# Patient Record
Sex: FEMALE | Race: WHITE | NOT HISPANIC OR LATINO | URBAN - METROPOLITAN AREA
[De-identification: names, ages, dates, MRNs, and addresses within clinical notes are randomized per-mention and may not be internally consistent; named-entity substitution may affect disease eponyms.]

---

## 2022-02-15 ENCOUNTER — OFFICE VISIT (OUTPATIENT)
Dept: OBSTETRICS AND GYNECOLOGY | Facility: CLINIC | Age: 72
End: 2022-02-15

## 2022-02-15 VITALS
WEIGHT: 282 LBS | SYSTOLIC BLOOD PRESSURE: 132 MMHG | BODY MASS INDEX: 44.26 KG/M2 | HEIGHT: 67 IN | DIASTOLIC BLOOD PRESSURE: 74 MMHG

## 2022-02-15 DIAGNOSIS — E66.01 MORBID OBESITY WITH BMI OF 40.0-44.9, ADULT: ICD-10-CM

## 2022-02-15 DIAGNOSIS — Z01.411 ENCOUNTER FOR GYNECOLOGICAL EXAMINATION WITH ABNORMAL FINDING: Primary | ICD-10-CM

## 2022-02-15 DIAGNOSIS — N39.46 MIXED STRESS AND URGE URINARY INCONTINENCE: ICD-10-CM

## 2022-02-15 PROCEDURE — G0101 CA SCREEN;PELVIC/BREAST EXAM: HCPCS | Performed by: NURSE PRACTITIONER

## 2022-02-15 RX ORDER — ESCITALOPRAM OXALATE 20 MG/1
TABLET ORAL
COMMUNITY

## 2022-02-15 RX ORDER — LEVOTHYROXINE SODIUM 0.12 MG/1
1 TABLET ORAL DAILY
COMMUNITY
Start: 2021-11-30 | End: 2023-02-16

## 2022-02-15 RX ORDER — FLUOCINONIDE TOPICAL SOLUTION USP, 0.05% 0.5 MG/ML
SOLUTION TOPICAL
COMMUNITY

## 2022-02-15 RX ORDER — DEXLANSOPRAZOLE 60 MG/1
CAPSULE, DELAYED RELEASE ORAL
COMMUNITY

## 2022-02-15 RX ORDER — NYSTATIN 100000 [USP'U]/G
POWDER TOPICAL
COMMUNITY
End: 2023-02-16

## 2022-02-15 RX ORDER — MONTELUKAST SODIUM 10 MG/1
TABLET ORAL
COMMUNITY
End: 2023-02-16

## 2022-02-15 RX ORDER — SIMVASTATIN 20 MG
TABLET ORAL
COMMUNITY

## 2022-02-15 NOTE — PROGRESS NOTES
Chief Complaint  Ricarda Alvarez is a 71 y.o.  female presenting for Gynecologic Exam ( patient.  annual exam.  )    History of Present Illness  Very pleasant 72yo woman, my previous pt at [facility], here for annual gyn exam.  Her only gyn c/o is mixed urinary incontinence.  It is just mildly bothersome, and she has to wear pads daily.  Declines any referral to urology.  Couns re: avoiding bladder irritants, Kegel's exercises, modest wt loss to help symptoms.  Up to date on pap and mammo for sure; she thinks that she is also up to date on DEXA and colon ca screening, but unsure of the dates.  Will sign JAZMYN & get records from .  Not SA any longer (male factors).  Denies any vaginitis or UTIs.  Otherwise, all ROS neg.    The following portions of the patient's history were reviewed and updated as appropriate: allergies, current medications, past family history, past medical history, past social history, past surgical history and problem list.    Allergies   Allergen Reactions   • Penicillins Hives   • Bupropion Other (See Comments)         Current Outpatient Medications:   •  Probiotic Product (PROBIOTIC-10 PO), Take 1 tablet by mouth Daily., Disp: , Rfl:   •  ASPIRIN 81 PO, aspirin 81 mg tablet  Daily, Disp: , Rfl:   •  dexlansoprazole (Dexilant) 60 MG capsule, Dexilant 60 mg capsule, delayed release  TAKE ONE CAPSULE BY MOUTH DAILY, Disp: , Rfl:   •  escitalopram (LEXAPRO) 20 MG tablet, escitalopram 20 mg tablet  TAKE ONE TABLET BY MOUTH DAILY, Disp: , Rfl:   •  fluocinonide (LIDEX) 0.05 % external solution, fluocinonide 0.05 % topical solution  APPLY TO THE AFFECTED AREA(S) ON SCALP BY TOPICAL ROUTE 2 TIMES PER DAY X 2 WEEKS THEN PRN, Disp: , Rfl:   •  levothyroxine (SYNTHROID, LEVOTHROID) 125 MCG tablet, Take 1 tablet by mouth Daily., Disp: , Rfl:   •  montelukast (SINGULAIR) 10 MG tablet, montelukast 10 mg tablet  TAKE ONE TABLET BY MOUTH EVERY NIGHT AT BEDTIME, Disp: , Rfl:   •  nystatin (MYCOSTATIN)  "682521 UNIT/GM powder, nystatin 100,000 unit/gram topical powder  APPLY TO THE AFFECTED AREA(S) BY TOPICAL ROUTE 2 TIMES PER DAY, Disp: , Rfl:   •  simvastatin (ZOCOR) 20 MG tablet, simvastatin 20 mg tablet  TAKE ONE TABLET BY MOUTH EVERY NIGHT AT BEDTIME, Disp: , Rfl:     Past Medical History:   Diagnosis Date   • Abnormal Pap smear of cervix    • Anxiety    • Depression    • Family history of breast cancer in sister    • GERD (gastroesophageal reflux disease)    • HPV (human papilloma virus) infection    • Hyperlipidemia    • Hypothyroidism    • Menopause         Past Surgical History:   Procedure Laterality Date   • APPENDECTOMY     •  SECTION         Objective  /74   Ht 170.2 cm (67\")   Wt 128 kg (282 lb)   LMP  (LMP Unknown)   Breastfeeding No   BMI 44.17 kg/m²     Physical Exam  Exam conducted with a chaperone present.   Constitutional:       Appearance: Normal appearance. She is obese.   HENT:      Head: Normocephalic.   Neck:      Thyroid: No thyroid mass or thyromegaly.   Cardiovascular:      Rate and Rhythm: Normal rate and regular rhythm.      Heart sounds: Normal heart sounds.   Pulmonary:      Effort: Pulmonary effort is normal.      Breath sounds: Normal breath sounds.   Chest:   Breasts:      Right: No inverted nipple, mass, nipple discharge, axillary adenopathy or supraclavicular adenopathy.      Left: No inverted nipple, mass, nipple discharge, axillary adenopathy or supraclavicular adenopathy.       Abdominal:      Palpations: Abdomen is soft. There is no mass.      Tenderness: There is no abdominal tenderness.   Genitourinary:     General: Normal vulva.      Labia:         Right: No lesion.         Left: No lesion.       Vagina: Normal. No erythema.      Cervix: No discharge, lesion or erythema.      Uterus: Not enlarged and not tender.       Adnexa:         Right: No mass or tenderness.          Left: No mass or tenderness.        Comments: Vagina is still pink & appears fairly " well estrogenized.  Anus appears wnl.  No rectal exam performed.  Lymphadenopathy:      Upper Body:      Right upper body: No supraclavicular or axillary adenopathy.      Left upper body: No supraclavicular or axillary adenopathy.   Neurological:      Mental Status: She is alert.         Assessment/Plan   Diagnoses and all orders for this visit:    1. Encounter for gynecological examination with abnormal finding (Primary)    2. Mixed stress and urge urinary incontinence    3. Morbid obesity with BMI of 40.0-44.9, adult (HCC)      See couns above in the HPI.  Couns re: SBE, mammo & other pvt hlth procedures, including vaccines.    Procedures        Return in about 1 year (around 2/15/2023) for Annual physical, Please sign JAZMYN & get records from ..    Cheryl Patel, APRN  02/15/2022

## 2023-02-16 ENCOUNTER — OFFICE VISIT (OUTPATIENT)
Dept: OBSTETRICS AND GYNECOLOGY | Facility: CLINIC | Age: 73
End: 2023-02-16
Payer: MEDICARE

## 2023-02-16 VITALS
WEIGHT: 243.4 LBS | BODY MASS INDEX: 38.2 KG/M2 | HEIGHT: 67 IN | SYSTOLIC BLOOD PRESSURE: 130 MMHG | DIASTOLIC BLOOD PRESSURE: 86 MMHG

## 2023-02-16 DIAGNOSIS — B35.4 TINEA CORPORIS: ICD-10-CM

## 2023-02-16 DIAGNOSIS — Z13.820 SCREENING FOR OSTEOPOROSIS: ICD-10-CM

## 2023-02-16 DIAGNOSIS — Z12.31 ENCOUNTER FOR SCREENING MAMMOGRAM FOR MALIGNANT NEOPLASM OF BREAST: ICD-10-CM

## 2023-02-16 DIAGNOSIS — Z01.411 ENCOUNTER FOR GYNECOLOGICAL EXAMINATION WITH ABNORMAL FINDING: Primary | ICD-10-CM

## 2023-02-16 DIAGNOSIS — Z78.0 POSTMENOPAUSAL: ICD-10-CM

## 2023-02-16 DIAGNOSIS — Z87.42 HISTORY OF ABNORMAL CERVICAL PAP SMEAR: ICD-10-CM

## 2023-02-16 DIAGNOSIS — Z86.19 HISTORY OF HPV INFECTION: ICD-10-CM

## 2023-02-16 PROCEDURE — G0101 CA SCREEN;PELVIC/BREAST EXAM: HCPCS | Performed by: NURSE PRACTITIONER

## 2023-02-16 RX ORDER — NYSTATIN 100000 U/G
1 OINTMENT TOPICAL 2 TIMES DAILY
Qty: 30 G | Refills: 3 | Status: SHIPPED | OUTPATIENT
Start: 2023-02-16

## 2023-02-16 RX ORDER — LEVOTHYROXINE SODIUM 112 MCG
1 TABLET ORAL DAILY
COMMUNITY
Start: 2023-02-05

## 2023-02-16 NOTE — PROGRESS NOTES
Chief Complaint  Ricarda Alvarez is a 72 y.o.  female presenting for Gynecologic Exam (C/O fungal eruption on abdomen and under breasts.)    History of Present Illness  Ricarda is a very pleasant 73yo woman, , here for gyn exam.  She has PMHx of abn pap & HR HPV.  She does wish to repeat the pap smear today.  She had a hard year with COVID in July, and she battled a bad depression for > 6 months.  She is way better now.  She had elevated LFTs for a while, but that resolved after she got 40# wt off.  Praised for this!    We reviewed pvt health procedures.      The following portions of the patient's history were reviewed and updated as appropriate: allergies, current medications, past family history, past medical history, past social history, past surgical history and problem list.    Allergies   Allergen Reactions   • Penicillins Hives   • Bupropion Other (See Comments)         Current Outpatient Medications:   •  ASPIRIN 81 PO, aspirin 81 mg tablet  Daily, Disp: , Rfl:   •  dexlansoprazole (Dexilant) 60 MG capsule, Dexilant 60 mg capsule, delayed release  TAKE ONE CAPSULE BY MOUTH DAILY, Disp: , Rfl:   •  escitalopram (LEXAPRO) 20 MG tablet, escitalopram 20 mg tablet  TAKE ONE TABLET BY MOUTH DAILY, Disp: , Rfl:   •  fluocinonide (LIDEX) 0.05 % external solution, fluocinonide 0.05 % topical solution  APPLY TO THE AFFECTED AREA(S) ON SCALP BY TOPICAL ROUTE 2 TIMES PER DAY X 2 WEEKS THEN PRN, Disp: , Rfl:   •  nystatin (MYCOSTATIN) 914054 UNIT/GM ointment, Apply 1 application topically to the appropriate area as directed 2 (Two) Times a Day., Disp: 30 g, Rfl: 3  •  simvastatin (ZOCOR) 20 MG tablet, simvastatin 20 mg tablet  TAKE ONE TABLET BY MOUTH EVERY NIGHT AT BEDTIME, Disp: , Rfl:   •  Synthroid 112 MCG tablet, Take 1 tablet by mouth Daily., Disp: , Rfl:     Past Medical History:   Diagnosis Date   • Abnormal Pap smear of cervix    • Anxiety    • Depression    • Family history of breast cancer in sister    •  "GERD (gastroesophageal reflux disease)    • HPV (human papilloma virus) infection    • Hyperlipidemia    • Hypothyroidism    • Menopause         Past Surgical History:   Procedure Laterality Date   • APPENDECTOMY     •  SECTION         Objective  /86   Ht 170.2 cm (67\")   Wt 110 kg (243 lb 6.4 oz)   LMP  (LMP Unknown)   Breastfeeding No   BMI 38.12 kg/m²     Physical Exam  Vitals and nursing note reviewed. Exam conducted with a chaperone present.   Constitutional:       General: She is not in acute distress.     Appearance: Normal appearance. She is obese. She is not ill-appearing.   HENT:      Head: Normocephalic.   Neck:      Thyroid: No thyroid mass or thyromegaly.   Cardiovascular:      Rate and Rhythm: Normal rate and regular rhythm.      Heart sounds: Normal heart sounds. No murmur heard.  Pulmonary:      Effort: Pulmonary effort is normal. No respiratory distress.      Breath sounds: Normal breath sounds.   Chest:   Breasts:     Right: No inverted nipple, mass or nipple discharge.      Left: No inverted nipple, mass or nipple discharge.   Abdominal:      Palpations: Abdomen is soft. There is no mass.      Tenderness: There is no abdominal tenderness.   Genitourinary:     General: Normal vulva.      Labia:         Right: No lesion.         Left: No lesion.       Vagina: Normal. No erythema.      Cervix: No discharge, lesion or erythema.      Uterus: Not enlarged and not tender.       Adnexa:         Right: No mass or tenderness.          Left: No mass or tenderness.        Comments: Anus appears wnl.  No rectal exam performed.  Lymphadenopathy:      Upper Body:      Right upper body: No supraclavicular or axillary adenopathy.      Left upper body: No supraclavicular or axillary adenopathy.   Skin:     General: Skin is warm and dry.      Comments: Tinea corporis in groin & lower abd skin fold.   Neurological:      Mental Status: She is alert and oriented to person, place, and time. "   Psychiatric:         Mood and Affect: Mood normal.         Behavior: Behavior normal.         Assessment/Plan   Diagnoses and all orders for this visit:    1. Encounter for gynecological examination with abnormal finding (Primary)    2. History of abnormal cervical Pap smear    3. History of HPV infection    4. Tinea corporis  -     nystatin (MYCOSTATIN) 060918 UNIT/GM ointment; Apply 1 application topically to the appropriate area as directed 2 (Two) Times a Day.  Dispense: 30 g; Refill: 3    5. Postmenopausal  -     DEXA Bone Density Axial; Future    6. Screening for osteoporosis  -     DEXA Bone Density Axial; Future    7. Encounter for screening mammogram for malignant neoplasm of breast  -     Mammo Screening Digital Tomosynthesis Bilateral With CAD; Future        Procedures    40 to 64: Counseling/Anticipatory Guidance Discussed: nutrition, physical activity, screenings and self-breast exam    Return in about 1 year (around 2/16/2024) for Annual physical.    Cheryl Patel, APRN  02/16/2023

## 2023-02-20 LAB — REF LAB TEST METHOD: NORMAL

## 2023-06-19 ENCOUNTER — TRANSCRIBE ORDERS (OUTPATIENT)
Dept: ADMINISTRATIVE | Facility: HOSPITAL | Age: 73
End: 2023-06-19
Payer: MEDICARE

## 2023-06-19 DIAGNOSIS — K44.9 DIAPHRAGMATIC HERNIA WITHOUT OBSTRUCTION OR GANGRENE: Primary | ICD-10-CM

## 2023-12-14 ENCOUNTER — OFFICE VISIT (OUTPATIENT)
Dept: OBSTETRICS AND GYNECOLOGY | Facility: CLINIC | Age: 73
End: 2023-12-14
Payer: MEDICARE

## 2023-12-14 VITALS
RESPIRATION RATE: 14 BRPM | DIASTOLIC BLOOD PRESSURE: 76 MMHG | SYSTOLIC BLOOD PRESSURE: 112 MMHG | WEIGHT: 242.6 LBS | BODY MASS INDEX: 38 KG/M2

## 2023-12-14 DIAGNOSIS — N95.0 PMB (POSTMENOPAUSAL BLEEDING): Primary | ICD-10-CM

## 2023-12-14 DIAGNOSIS — N89.8 BLOODY VAGINAL DISCHARGE: ICD-10-CM

## 2023-12-14 RX ORDER — LEVOTHYROXINE SODIUM 137 UG/1
TABLET ORAL
COMMUNITY
Start: 2023-11-08

## 2023-12-14 RX ORDER — BUSPIRONE HYDROCHLORIDE 5 MG/1
5 TABLET ORAL 3 TIMES DAILY
COMMUNITY
Start: 2023-12-07

## 2023-12-15 ENCOUNTER — TELEPHONE (OUTPATIENT)
Dept: OBSTETRICS AND GYNECOLOGY | Facility: CLINIC | Age: 73
End: 2023-12-15

## 2023-12-15 ENCOUNTER — OFFICE VISIT (OUTPATIENT)
Dept: OBSTETRICS AND GYNECOLOGY | Facility: CLINIC | Age: 73
End: 2023-12-15
Payer: MEDICARE

## 2023-12-15 VITALS
BODY MASS INDEX: 37.73 KG/M2 | DIASTOLIC BLOOD PRESSURE: 82 MMHG | WEIGHT: 240.4 LBS | HEIGHT: 67 IN | SYSTOLIC BLOOD PRESSURE: 138 MMHG

## 2023-12-15 DIAGNOSIS — Z53.9 PROCEDURE NOT CARRIED OUT: ICD-10-CM

## 2023-12-15 DIAGNOSIS — N95.0 POSTMENOPAUSAL BLEEDING: Primary | ICD-10-CM

## 2023-12-15 NOTE — TELEPHONE ENCOUNTER
Marcelino Waite, do you mind to schedule this pt for a SIS and then call her on her cell?     Thank you!

## 2023-12-17 RX ORDER — CLOBETASOL PROPIONATE 0.5 MG/G
1 OINTMENT TOPICAL 2 TIMES DAILY
Qty: 30 G | Refills: 2 | Status: SHIPPED | OUTPATIENT
Start: 2023-12-17

## 2023-12-17 NOTE — PROGRESS NOTES
"Chief Complaint  Ricarda Alvarez is a 73 y.o.  female presenting for Procedure (Endometrial biopsy.) and Results (Pelvic ultrasound yesterday.)    History of Present Illness  Ricarda is here today for EMBx.  She is a 72yo woman, , with c/o recent PMB.  She had a pelvic US yesterday, which revealed an EMS of 14.6.  It was \"most suggestive of endometrial polypoid disease\".  And EMBx or SIS was suggested.      The following portions of the patient's history were reviewed and updated as appropriate: allergies, current medications, past family history, past medical history, past social history, past surgical history, and problem list.    Allergies   Allergen Reactions    Penicillins Hives    Bupropion Other (See Comments)         Current Outpatient Medications:     ASPIRIN 81 PO, aspirin 81 mg tablet  Daily, Disp: , Rfl:     busPIRone (BUSPAR) 5 MG tablet, Take 1 tablet by mouth 3 (Three) Times a Day., Disp: , Rfl:     dexlansoprazole (Dexilant) 60 MG capsule, Dexilant 60 mg capsule, delayed release  TAKE ONE CAPSULE BY MOUTH DAILY, Disp: , Rfl:     escitalopram (LEXAPRO) 20 MG tablet, escitalopram 20 mg tablet  TAKE ONE TABLET BY MOUTH DAILY, Disp: , Rfl:     fluocinonide (LIDEX) 0.05 % external solution, fluocinonide 0.05 % topical solution  APPLY TO THE AFFECTED AREA(S) ON SCALP BY TOPICAL ROUTE 2 TIMES PER DAY X 2 WEEKS THEN PRN (Patient not taking: Reported on 2023), Disp: , Rfl:     levothyroxine (SYNTHROID, LEVOTHROID) 137 MCG tablet, , Disp: , Rfl:     nystatin (MYCOSTATIN) 074596 UNIT/GM ointment, Apply 1 application topically to the appropriate area as directed 2 (Two) Times a Day., Disp: 30 g, Rfl: 3    simvastatin (ZOCOR) 20 MG tablet, simvastatin 20 mg tablet  TAKE ONE TABLET BY MOUTH EVERY NIGHT AT BEDTIME, Disp: , Rfl:     Past Medical History:   Diagnosis Date    Abnormal Pap smear of cervix     Anxiety     Depression     Family history of breast cancer in sister     GERD (gastroesophageal " "reflux disease)     HPV (human papilloma virus) infection     Hyperlipidemia     Hypothyroidism     Menopause         Past Surgical History:   Procedure Laterality Date    APPENDECTOMY       SECTION         Objective  /82   Ht 170.2 cm (67\")   Wt 109 kg (240 lb 6.4 oz)   LMP  (LMP Unknown)   Breastfeeding No   BMI 37.65 kg/m²     Physical Exam  Vitals and nursing note reviewed.   Constitutional:       General: She is not in acute distress.     Appearance: Normal appearance. She is not ill-appearing.   Skin:     General: Skin is warm and dry.      Findings: Rash present.      Comments: Pruritic, chronic rash in the upper cleft of buttocks.  Antifungal meds have failed to clear it.  She is willing to do short trial of a topical steroid.    Neurological:      Mental Status: She is alert and oriented to person, place, and time.   Psychiatric:         Mood and Affect: Mood normal.         Behavior: Behavior normal.         Assessment/Plan   Diagnoses and all orders for this visit:    1. Postmenopausal bleeding (Primary)  -     US Hysterosonogram Doppler; Future    2. Procedure not carried out  -     US Hysterosonogram Doppler; Future    Failed attempt at EMBx.  Refer for SIS.      Endometrial Biopsy    Date/Time: 2023 4:44 PM    Performed by: Cheryl Patel APRN  Authorized by: Cheryl Patel APRN    Consent:     Consent obtained: written    Consent given by: patient    Risks discussed: bleeding, infection, need for repeat procedure, conversion to surgery and pain    Alternatives discussed: observation and referral    Patient agrees, verbalizes understanding, and wants to proceed: yes      Procedure explained and questions answered to patient or proxy's satisfaction: yes    Indications:     Indications: postmenopausal bleeding and thickened endometrium      Chronicity of post-menopausal bleeding: new    Progression of post-menopausal bleeding: unchanged  Pre-procedure:     Urine " pregnancy test: N/A    Procedure:     A bimanual exam was performed: no      Prepped with: Betadine    Tenaculum used: yes      A local block was performed: no    Findings:     Cervix: stenotic    Comments:     Procedure comments: Cervical os was stenotic.  I made repeated attempts, but was unable to get through the internal os, in dilating the cervical canal.  The effort was aborted, and will refer for attempt at SIS.        40 to 64: Counseling/Anticipatory Guidance Discussed: EMBx risks & benefits.          Return for Saline Infused Scan (soon, if possible).    Cheryl Patel, APRN  12/15/2023

## 2023-12-28 NOTE — PROGRESS NOTES
Chief Complaint  Ricarda Alvarez is a 73 y.o.  female presenting for Follow-up (Has had abnormal discharge and odor for last couple months, the noticed red spotting just yesterday and a little today)    History of Present Illness  Ricarda is a 74yo woman, , here for gyn problem.  She c/o bothersome vaginal discharge x few months, and then began having some vaginal spotting yesterday & today.  She hasn't had any other episodes of bleeding since age 52yo.    Pap smear was taken 23, negative.      The following portions of the patient's history were reviewed and updated as appropriate: allergies, current medications, past family history, past medical history, past social history, past surgical history, and problem list.    Allergies   Allergen Reactions    Penicillins Hives    Bupropion Other (See Comments)         Current Outpatient Medications:     ASPIRIN 81 PO, aspirin 81 mg tablet  Daily, Disp: , Rfl:     busPIRone (BUSPAR) 5 MG tablet, Take 1 tablet by mouth 3 (Three) Times a Day., Disp: , Rfl:     dexlansoprazole (Dexilant) 60 MG capsule, Dexilant 60 mg capsule, delayed release  TAKE ONE CAPSULE BY MOUTH DAILY, Disp: , Rfl:     escitalopram (LEXAPRO) 20 MG tablet, escitalopram 20 mg tablet  TAKE ONE TABLET BY MOUTH DAILY, Disp: , Rfl:     levothyroxine (SYNTHROID, LEVOTHROID) 137 MCG tablet, , Disp: , Rfl:     nystatin (MYCOSTATIN) 957723 UNIT/GM ointment, Apply 1 application topically to the appropriate area as directed 2 (Two) Times a Day., Disp: 30 g, Rfl: 3    simvastatin (ZOCOR) 20 MG tablet, simvastatin 20 mg tablet  TAKE ONE TABLET BY MOUTH EVERY NIGHT AT BEDTIME, Disp: , Rfl:     clobetasol (TEMOVATE) 0.05 % ointment, Apply 1 application  topically to the appropriate area as directed 2 (Two) Times a Day., Disp: 30 g, Rfl: 2    Past Medical History:   Diagnosis Date    Abnormal Pap smear of cervix     Anxiety     Depression     Family history of breast cancer in sister     GERD  (gastroesophageal reflux disease)     HPV (human papilloma virus) infection     Hyperlipidemia     Hypothyroidism     Menopause         Past Surgical History:   Procedure Laterality Date    APPENDECTOMY       SECTION         Objective  /76 (BP Location: Right arm, Patient Position: Sitting, Cuff Size: Adult)   Resp 14   Wt 110 kg (242 lb 9.6 oz)   LMP  (LMP Unknown)   BMI 38.00 kg/m²     Physical Exam  Vitals and nursing note reviewed. Exam conducted with a chaperone present.   Constitutional:       Appearance: Normal appearance.   Abdominal:      General: Bowel sounds are normal.      Palpations: Abdomen is soft. There is no mass.      Tenderness: There is no abdominal tenderness.   Genitourinary:     General: Normal vulva.      Labia:         Right: No rash, tenderness or lesion.         Left: No rash, tenderness or lesion.       Vagina: Normal. No vaginal discharge or erythema.      Cervix: No cervical motion tenderness, discharge, lesion or erythema.      Uterus: Normal. Not enlarged and not tender.       Adnexa: Right adnexa normal and left adnexa normal.        Right: No mass or tenderness.          Left: No mass or tenderness.        Rectum: Normal.      Comments: The vaginal mucosa is still fairly pink & appears well estrogenized.  No cervical polyps or abn discharge.    Anus appears wnl.  (No rectal exam performed.)  Skin:     General: Skin is warm.   Neurological:      Mental Status: She is oriented to person, place, and time.   Psychiatric:         Mood and Affect: Mood normal.         Behavior: Behavior normal.         Assessment/Plan   Diagnoses and all orders for this visit:    1. PMB (postmenopausal bleeding) (Primary)  -     US Non-ob Transvaginal; Future    2. Bloody vaginal discharge  -     US Non-ob Transvaginal; Future    Will get US asap today & then try to do EMBx tomorrow.      Procedures    40 to 64: Counseling/Anticipatory Guidance Discussed: PMB and need for  work-up.    Return in about 1 day (around 12/15/2023) for US today;  EMBx tomorrow AM.    Cheryl Patel, APRN  12/14/2023

## 2024-01-02 NOTE — PROGRESS NOTES
Saline Infusion Sonogram     Date of procedure:  January 3, 2024     Risks and benefits were discussed.  All questions were answered.  Consents given by the patient verbally.     Pre-op indication:  Post-menopausal bleeding  Thickened endometrial lining  Corie Patel patient     Procedure documentation:    After the patient was identified and informed consent given she was placed in dorsal lithotomy position in stirrups and draped. A sterile speculum was placed inside the vagina with good visualization of the cervix and the cervix was cleaned with Betadine swabs.  The cervix was grasped with a single-tooth tenaculum.  A balloon catheter was introduced through the cervix without complication and inflated. The speculum was removed. The uterine cavity was then evaluated with transvaginal ultrasonography while saline was being instilled.    The findings were as follows:  The bilayer endometrial stripe measured 5 mm.  Focal lesions were present    The balloon was then released and the cavity was then drained of saline and the catheter was removed. Scant bleeding was noted from the cervical lip and the procedure was completed. The patient tolerated the procedure well and was given follow-up instructions.      Impression: Endometrial polypoid disease is present   Plan: Hysteroscopic evalulation of the cavity with probable Myosure  Today I discussed with Ricarda the risks of her upcoming hysteroscopy with possible Myosure resection of intracavitary pathology. Risks reviewed included intraoperative bleeding, infection at the site of surgery and damage to the adjacent surrounding organs. Additionally, the small risk for reoperation in the event of unanticipated bleeding or surgical injury was discussed.  Finally we discussed the risks of cerebral and/or pulmonary edema related to excess absorption of the distending fluid and the small chance the procedure could not be completed if there was an excessive mismatch of fluid infused &  fluid recovered.  All of her questions were answered fully.  She left with a very clear understanding of the preoperative surgical indications and the nature of the surgery for which she is scheduled.  She understands to be NPO after midnight and to be at the preoperative area ~ 1 hour prior to the scheduled surgical start time.     This note was electronically signed.    Sandy Nix MD   January 3, 2024

## 2024-01-03 ENCOUNTER — OFFICE VISIT (OUTPATIENT)
Dept: OBSTETRICS AND GYNECOLOGY | Facility: CLINIC | Age: 74
End: 2024-01-03
Payer: MEDICARE

## 2024-01-03 DIAGNOSIS — N95.0 PMB (POSTMENOPAUSAL BLEEDING): Primary | ICD-10-CM

## 2024-01-24 ENCOUNTER — HOSPITAL ENCOUNTER (EMERGENCY)
Facility: HOSPITAL | Age: 74
Discharge: HOME | End: 2024-01-24
Attending: EMERGENCY MEDICINE | Admitting: EMERGENCY MEDICINE
Payer: MEDICARE

## 2024-01-24 ENCOUNTER — APPOINTMENT (OUTPATIENT)
Dept: CT IMAGING | Facility: HOSPITAL | Age: 74
End: 2024-01-24
Payer: MEDICARE

## 2024-01-24 VITALS
TEMPERATURE: 97.8 F | SYSTOLIC BLOOD PRESSURE: 153 MMHG | OXYGEN SATURATION: 92 % | WEIGHT: 230 LBS | HEIGHT: 67 IN | RESPIRATION RATE: 22 BRPM | BODY MASS INDEX: 36.1 KG/M2 | HEART RATE: 87 BPM | DIASTOLIC BLOOD PRESSURE: 94 MMHG

## 2024-01-24 DIAGNOSIS — K59.00 CONSTIPATION, UNSPECIFIED CONSTIPATION TYPE: Primary | ICD-10-CM

## 2024-01-24 DIAGNOSIS — E87.6 HYPOKALEMIA: ICD-10-CM

## 2024-01-24 LAB
ALBUMIN SERPL-MCNC: 4.1 G/DL (ref 3.5–5.2)
ALBUMIN/GLOB SERPL: 1.4 G/DL
ALP SERPL-CCNC: 94 U/L (ref 39–117)
ALT SERPL W P-5'-P-CCNC: 12 U/L (ref 1–33)
ANION GAP SERPL CALCULATED.3IONS-SCNC: 16 MMOL/L (ref 5–15)
AST SERPL-CCNC: 15 U/L (ref 1–32)
BACTERIA UR QL AUTO: ABNORMAL /HPF
BASOPHILS # BLD AUTO: 0.04 10*3/MM3 (ref 0–0.2)
BASOPHILS NFR BLD AUTO: 0.6 % (ref 0–1.5)
BILIRUB SERPL-MCNC: 0.8 MG/DL (ref 0–1.2)
BILIRUB UR QL STRIP: ABNORMAL
BUN SERPL-MCNC: 10 MG/DL (ref 8–23)
BUN/CREAT SERPL: 11.9 (ref 7–25)
CALCIUM SPEC-SCNC: 9.2 MG/DL (ref 8.6–10.5)
CHLORIDE SERPL-SCNC: 102 MMOL/L (ref 98–107)
CLARITY UR: CLEAR
CO2 SERPL-SCNC: 22 MMOL/L (ref 22–29)
COLOR UR: YELLOW
CREAT BLDA-MCNC: 0.8 MG/DL (ref 0.6–1.3)
CREAT BLDA-MCNC: 0.8 MG/DL (ref 0.6–1.3)
CREAT SERPL-MCNC: 0.84 MG/DL (ref 0.57–1)
D-LACTATE SERPL-SCNC: 1.4 MMOL/L (ref 0.5–2)
DEPRECATED RDW RBC AUTO: 43.7 FL (ref 37–54)
EGFRCR SERPLBLD CKD-EPI 2021: 73.5 ML/MIN/1.73
EOSINOPHIL # BLD AUTO: 0.04 10*3/MM3 (ref 0–0.4)
EOSINOPHIL NFR BLD AUTO: 0.6 % (ref 0.3–6.2)
ERYTHROCYTE [DISTWIDTH] IN BLOOD BY AUTOMATED COUNT: 13 % (ref 12.3–15.4)
GLOBULIN UR ELPH-MCNC: 3 GM/DL
GLUCOSE SERPL-MCNC: 117 MG/DL (ref 65–99)
GLUCOSE UR STRIP-MCNC: NEGATIVE MG/DL
HCT VFR BLD AUTO: 46.4 % (ref 34–46.6)
HGB BLD-MCNC: 15.5 G/DL (ref 12–15.9)
HGB UR QL STRIP.AUTO: ABNORMAL
HOLD SPECIMEN: NORMAL
HYALINE CASTS UR QL AUTO: ABNORMAL /LPF
IMM GRANULOCYTES # BLD AUTO: 0.01 10*3/MM3 (ref 0–0.05)
IMM GRANULOCYTES NFR BLD AUTO: 0.1 % (ref 0–0.5)
KETONES UR QL STRIP: ABNORMAL
LEUKOCYTE ESTERASE UR QL STRIP.AUTO: ABNORMAL
LIPASE SERPL-CCNC: 52 U/L (ref 13–60)
LYMPHOCYTES # BLD AUTO: 1.49 10*3/MM3 (ref 0.7–3.1)
LYMPHOCYTES NFR BLD AUTO: 21.5 % (ref 19.6–45.3)
MCH RBC QN AUTO: 30.6 PG (ref 26.6–33)
MCHC RBC AUTO-ENTMCNC: 33.4 G/DL (ref 31.5–35.7)
MCV RBC AUTO: 91.5 FL (ref 79–97)
MONOCYTES # BLD AUTO: 0.29 10*3/MM3 (ref 0.1–0.9)
MONOCYTES NFR BLD AUTO: 4.2 % (ref 5–12)
NEUTROPHILS NFR BLD AUTO: 5.05 10*3/MM3 (ref 1.7–7)
NEUTROPHILS NFR BLD AUTO: 73 % (ref 42.7–76)
NITRITE UR QL STRIP: NEGATIVE
NRBC BLD AUTO-RTO: 0 /100 WBC (ref 0–0.2)
PH UR STRIP.AUTO: 6.5 [PH] (ref 5–8)
PLATELET # BLD AUTO: 251 10*3/MM3 (ref 140–450)
PMV BLD AUTO: 10.2 FL (ref 6–12)
POTASSIUM SERPL-SCNC: 3.4 MMOL/L (ref 3.5–5.2)
PROT SERPL-MCNC: 7.1 G/DL (ref 6–8.5)
PROT UR QL STRIP: ABNORMAL
RBC # BLD AUTO: 5.07 10*6/MM3 (ref 3.77–5.28)
RBC # UR STRIP: ABNORMAL /HPF
REF LAB TEST METHOD: ABNORMAL
SODIUM SERPL-SCNC: 140 MMOL/L (ref 136–145)
SP GR UR STRIP: 1.01 (ref 1–1.03)
SQUAMOUS #/AREA URNS HPF: ABNORMAL /HPF
UROBILINOGEN UR QL STRIP: ABNORMAL
WBC # UR STRIP: ABNORMAL /HPF
WBC NRBC COR # BLD AUTO: 6.92 10*3/MM3 (ref 3.4–10.8)
WHOLE BLOOD HOLD COAG: NORMAL
WHOLE BLOOD HOLD SPECIMEN: NORMAL

## 2024-01-24 PROCEDURE — 85025 COMPLETE CBC W/AUTO DIFF WBC: CPT

## 2024-01-24 PROCEDURE — 80053 COMPREHEN METABOLIC PANEL: CPT

## 2024-01-24 PROCEDURE — 83605 ASSAY OF LACTIC ACID: CPT

## 2024-01-24 PROCEDURE — 99285 EMERGENCY DEPT VISIT HI MDM: CPT

## 2024-01-24 PROCEDURE — 25510000001 IOPAMIDOL 61 % SOLUTION: Performed by: EMERGENCY MEDICINE

## 2024-01-24 PROCEDURE — 25810000003 SODIUM CHLORIDE 0.9 % SOLUTION: Performed by: EMERGENCY MEDICINE

## 2024-01-24 PROCEDURE — 83690 ASSAY OF LIPASE: CPT

## 2024-01-24 PROCEDURE — 74177 CT ABD & PELVIS W/CONTRAST: CPT

## 2024-01-24 PROCEDURE — 81001 URINALYSIS AUTO W/SCOPE: CPT

## 2024-01-24 PROCEDURE — 82565 ASSAY OF CREATININE: CPT

## 2024-01-24 RX ORDER — POTASSIUM CHLORIDE 1.5 G/1.58G
40 POWDER, FOR SOLUTION ORAL ONCE
Status: COMPLETED | OUTPATIENT
Start: 2024-01-24 | End: 2024-01-24

## 2024-01-24 RX ORDER — SODIUM CHLORIDE 9 MG/ML
10 INJECTION, SOLUTION INTRAMUSCULAR; INTRAVENOUS; SUBCUTANEOUS AS NEEDED
Status: DISCONTINUED | OUTPATIENT
Start: 2024-01-24 | End: 2024-01-24 | Stop reason: HOSPADM

## 2024-01-24 RX ADMIN — POTASSIUM CHLORIDE 40 MEQ: 1.5 POWDER, FOR SOLUTION ORAL at 11:54

## 2024-01-24 RX ADMIN — IOPAMIDOL 84 ML: 612 INJECTION, SOLUTION INTRAVENOUS at 10:40

## 2024-01-24 RX ADMIN — SODIUM CHLORIDE 500 ML: 9 INJECTION, SOLUTION INTRAVENOUS at 10:19

## 2024-01-24 RX ADMIN — POLYETHYLENE GLYCOL 3350, SODIUM SULFATE ANHYDROUS, SODIUM BICARBONATE, SODIUM CHLORIDE, POTASSIUM CHLORIDE 4000 ML: 236; 22.74; 6.74; 5.86; 2.97 POWDER, FOR SOLUTION ORAL at 11:54

## 2024-01-24 NOTE — ED PROVIDER NOTES
"Subjective   History of Present Illness  73-year-old female presents for evaluation of \"constipation.\"  Of note, the patient tells me that nearly 2 weeks ago she had loose stools for about 4 days.  She then took some Kaopectate and has been experiencing constipation for the past 6 days with no substantial bowel movement over that span.  She endorses mild abdominal cramps.  She has never had issues with constipation like this before in the past and is unsure as to what might be triggering her symptoms.  She denies any fevers.  No known dietary triggers for her symptoms.  She has a history of anxiety and did not know if this could be a contributing factor.      Review of Systems   Gastrointestinal:  Positive for constipation.        Abdominal cramps   All other systems reviewed and are negative.      Past Medical History:   Diagnosis Date    Abnormal Pap smear of cervix     Anxiety     Depression     Family history of breast cancer in sister     GERD (gastroesophageal reflux disease)     HPV (human papilloma virus) infection     Hyperlipidemia     Hypothyroidism     Menopause        Allergies   Allergen Reactions    Penicillins Hives    Bupropion Other (See Comments)       Past Surgical History:   Procedure Laterality Date    APPENDECTOMY       SECTION         Family History   Problem Relation Age of Onset    Coronary artery disease Mother     Breast cancer Sister        Social History     Socioeconomic History    Marital status:    Tobacco Use    Smoking status: Every Day     Types: Cigarettes    Smokeless tobacco: Never    Tobacco comments:     1-2 cigarettes/day   Vaping Use    Vaping Use: Never used   Substance and Sexual Activity    Alcohol use: Not Currently    Drug use: Never    Sexual activity: Not Currently     Birth control/protection: Post-menopausal           Objective   Physical Exam  Vitals and nursing note reviewed.   Constitutional:       General: She is not in acute distress.     " "Appearance: She is well-developed. She is not diaphoretic.      Comments: Nontoxic-appearing female   HENT:      Head: Normocephalic and atraumatic.   Cardiovascular:      Rate and Rhythm: Normal rate and regular rhythm.      Heart sounds: Normal heart sounds. No murmur heard.     No friction rub. No gallop.   Pulmonary:      Effort: Pulmonary effort is normal. No respiratory distress.      Breath sounds: Normal breath sounds. No wheezing or rales.   Abdominal:      General: Bowel sounds are normal. There is no distension.      Palpations: Abdomen is soft. There is no mass.      Tenderness: There is no abdominal tenderness. There is no guarding or rebound.      Comments: No focal abdominal tenderness, no peritoneal signs, no pain out of proportion to exam   Musculoskeletal:         General: Normal range of motion.   Skin:     General: Skin is warm and dry.      Findings: No erythema or rash.   Neurological:      Mental Status: She is alert and oriented to person, place, and time.      Comments: Normal gait   Psychiatric:         Mood and Affect: Mood normal.         Thought Content: Thought content normal.         Judgment: Judgment normal.         Procedures           ED Course  ED Course as of 01/24/24 1351   Wed Jan 24, 2024   1017 73-year-old female presents for evaluation of \"constipation.\"  Of note, the patient tells me that nearly 2 weeks ago she had loose stools for about 4 days.  She then took some Kaopectate and has been experiencing constipation for the past 6 days with no substantial bowel movement over that span.  She endorses abdominal cramps.  She has not had issues with constipation before in the past.  As a result of her ongoing symptoms, she came here to the ED to be evaluated.  On arrival, the patient is nontoxic-appearing.  Nonsurgical abdomen.  Broad differential diagnosis.  We will obtain labs and imaging, and we will reassess following initial interventions. [DD]   1059 I personally and " independently reviewed the patient's CT images and findings, and I am in agreement with the radiologist regarding CT interpretation--particularly, there is no bowel obstruction or emergent/surgical intra-abdominal process noted. [DD]   1142 Labs remarkable only for mild hypokalemia.  Oral potassium replacement given here in the ED. [DD]   1142 Patient reassured.  We will discharge her home with GoLytely for symptom relief.  She will follow-up with her primary care physician within the next week.  We discussed dietary modifications.  Agreeable with plan and given appropriate strict return precautions. [DD]      ED Course User Index  [DD] Wai Sanchez MD                                   Recent Results (from the past 24 hour(s))   Comprehensive Metabolic Panel    Collection Time: 01/24/24  9:46 AM    Specimen: Blood   Result Value Ref Range    Glucose 117 (H) 65 - 99 mg/dL    BUN 10 8 - 23 mg/dL    Creatinine 0.84 0.57 - 1.00 mg/dL    Sodium 140 136 - 145 mmol/L    Potassium 3.4 (L) 3.5 - 5.2 mmol/L    Chloride 102 98 - 107 mmol/L    CO2 22.0 22.0 - 29.0 mmol/L    Calcium 9.2 8.6 - 10.5 mg/dL    Total Protein 7.1 6.0 - 8.5 g/dL    Albumin 4.1 3.5 - 5.2 g/dL    ALT (SGPT) 12 1 - 33 U/L    AST (SGOT) 15 1 - 32 U/L    Alkaline Phosphatase 94 39 - 117 U/L    Total Bilirubin 0.8 0.0 - 1.2 mg/dL    Globulin 3.0 gm/dL    A/G Ratio 1.4 g/dL    BUN/Creatinine Ratio 11.9 7.0 - 25.0    Anion Gap 16.0 (H) 5.0 - 15.0 mmol/L    eGFR 73.5 >60.0 mL/min/1.73   Lipase    Collection Time: 01/24/24  9:46 AM    Specimen: Blood   Result Value Ref Range    Lipase 52 13 - 60 U/L   Lactic Acid, Plasma    Collection Time: 01/24/24  9:46 AM    Specimen: Blood   Result Value Ref Range    Lactate 1.4 0.5 - 2.0 mmol/L   Green Top (Gel)    Collection Time: 01/24/24  9:46 AM   Result Value Ref Range    Extra Tube Hold for add-ons.    Lavender Top    Collection Time: 01/24/24  9:46 AM   Result Value Ref Range    Extra Tube hold for add-on     Gold Top - SST    Collection Time: 01/24/24  9:46 AM   Result Value Ref Range    Extra Tube Hold for add-ons.    Light Blue Top    Collection Time: 01/24/24  9:46 AM   Result Value Ref Range    Extra Tube Hold for add-ons.    CBC Auto Differential    Collection Time: 01/24/24  9:46 AM    Specimen: Blood   Result Value Ref Range    WBC 6.92 3.40 - 10.80 10*3/mm3    RBC 5.07 3.77 - 5.28 10*6/mm3    Hemoglobin 15.5 12.0 - 15.9 g/dL    Hematocrit 46.4 34.0 - 46.6 %    MCV 91.5 79.0 - 97.0 fL    MCH 30.6 26.6 - 33.0 pg    MCHC 33.4 31.5 - 35.7 g/dL    RDW 13.0 12.3 - 15.4 %    RDW-SD 43.7 37.0 - 54.0 fl    MPV 10.2 6.0 - 12.0 fL    Platelets 251 140 - 450 10*3/mm3    Neutrophil % 73.0 42.7 - 76.0 %    Lymphocyte % 21.5 19.6 - 45.3 %    Monocyte % 4.2 (L) 5.0 - 12.0 %    Eosinophil % 0.6 0.3 - 6.2 %    Basophil % 0.6 0.0 - 1.5 %    Immature Grans % 0.1 0.0 - 0.5 %    Neutrophils, Absolute 5.05 1.70 - 7.00 10*3/mm3    Lymphocytes, Absolute 1.49 0.70 - 3.10 10*3/mm3    Monocytes, Absolute 0.29 0.10 - 0.90 10*3/mm3    Eosinophils, Absolute 0.04 0.00 - 0.40 10*3/mm3    Basophils, Absolute 0.04 0.00 - 0.20 10*3/mm3    Immature Grans, Absolute 0.01 0.00 - 0.05 10*3/mm3    nRBC 0.0 0.0 - 0.2 /100 WBC   Urinalysis With Microscopic If Indicated (No Culture) - Urine, Clean Catch    Collection Time: 01/24/24  9:49 AM    Specimen: Urine, Clean Catch   Result Value Ref Range    Color, UA Yellow Yellow, Straw    Appearance, UA Clear Clear    pH, UA 6.5 5.0 - 8.0    Specific Gravity, UA 1.015 1.005 - 1.030    Glucose, UA Negative Negative    Ketones, UA 15 mg/dL (1+) (A) Negative    Bilirubin, UA Small (1+) (A) Negative    Blood, UA Trace (A) Negative    Protein, UA 30 mg/dL (1+) (A) Negative    Leuk Esterase, UA Trace (A) Negative    Nitrite, UA Negative Negative    Urobilinogen, UA 4.0 E.U./dL (A) 0.2 - 1.0 E.U./dL   Urinalysis, Microscopic Only - Urine, Clean Catch    Collection Time: 01/24/24  9:49 AM    Specimen: Urine, Clean  "Catch   Result Value Ref Range    RBC, UA None Seen None Seen, 0-2 /HPF    WBC, UA 0-2 None Seen, 0-2 /HPF    Bacteria, UA None Seen None Seen, Trace /HPF    Squamous Epithelial Cells, UA 21-30 (A) None Seen, 0-2 /HPF    Hyaline Casts, UA None Seen 0 - 6 /LPF    Methodology Automated Microscopy    POC Creatinine    Collection Time: 01/24/24  9:56 AM    Specimen: Blood   Result Value Ref Range    Creatinine 0.80 0.60 - 1.30 mg/dL   POC Creatinine    Collection Time: 01/24/24  9:57 AM    Specimen: Blood   Result Value Ref Range    Creatinine 0.80 0.60 - 1.30 mg/dL     Note: In addition to lab results from this visit, the labs listed above may include labs taken at another facility or during a different encounter within the last 24 hours. Please correlate lab times with ED admission and discharge times for further clarification of the services performed during this visit.    CT Abdomen Pelvis With Contrast   Final Result   Impression:   1.No acute abnormality identified within the abdomen or pelvis.   2.Colonic diverticulosis.   3.Endometrium appears prominent for age. Please correlate with findings on recent pelvic ultrasound.   4.Fat-containing umbilical hernia.   5.16 mm soft tissue focus within the left breast (series 2, image 12) is felt to represent fibroglandular tissue. Correlation with mammography may be beneficial if not recently performed.      Electronically Signed: Alexi Huber MD     1/24/2024 10:52 AM EST     Workstation ID: [id number]        Vitals:    01/24/24 0930   BP: 153/94   Pulse: 87   Resp: 22   Temp: 97.8 °F (36.6 °C)   TempSrc: Oral   SpO2: 92%   Weight: 104 kg (230 lb)   Height: 170.2 cm (67\")     Medications   Sodium Chloride (PF) 0.9 % 10 mL (has no administration in time range)   sodium chloride 0.9 % bolus 500 mL (0 mL Intravenous Stopped 1/24/24 1155)   iopamidol (ISOVUE-300) 61 % injection 100 mL (84 mL Intravenous Given 1/24/24 1040)   polyethylene glycol (GoLYTELY) solution 4,000 mL " (4,000 mL Oral Given 1/24/24 1154)   potassium chloride (KLOR-CON) packet 40 mEq (40 mEq Oral Given 1/24/24 1154)     ECG/EMG Results (last 24 hours)       ** No results found for the last 24 hours. **          No orders to display                 Medical Decision Making  Problems Addressed:  Constipation, unspecified constipation type: complicated acute illness or injury  Hypokalemia: complicated acute illness or injury    Amount and/or Complexity of Data Reviewed  Labs: ordered.  Radiology: ordered.    Risk  Prescription drug management.        Final diagnoses:   Constipation, unspecified constipation type   Hypokalemia       ED Disposition  ED Disposition       ED Disposition   Discharge    Condition   Stable    Comment   --               Jayde Elkins MD  [address] [address]  [phone]    In 1 week           Medication List      No changes were made to your prescriptions during this visit.            Wai Sanchez MD  01/24/24 5257

## 2024-02-08 ENCOUNTER — PREP FOR SURGERY (OUTPATIENT)
Dept: OTHER | Facility: HOSPITAL | Age: 74
End: 2024-02-08
Payer: MEDICARE

## 2024-02-08 NOTE — H&P
Ricarda Alvarez  : 1950  MRN: [id number]  CSN: [id number]    History and Physical    Subjective   Ricarda Alvarez is a 73 y.o. year old  who present for diagnostic hysteroscopy with anticipated Myosure due to multiple endometrial polyps notes on [facility] in the setting of PMB.    Past Medical History:   Diagnosis Date    Abnormal Pap smear of cervix     Anxiety     Depression     Family history of breast cancer in sister     GERD (gastroesophageal reflux disease)     HPV (human papilloma virus) infection     Hyperlipidemia     Hypothyroidism     Menopause      Past Surgical History:   Procedure Laterality Date    APPENDECTOMY       SECTION       OB History    Para Term  AB Living   1 1 0 0 0 1   SAB IAB Ectopic Molar Multiple Live Births   0 0 0 0 0 0      # Outcome Date GA Lbr Bradley/2nd Weight Sex Delivery Anes PTL Lv   1 Para              Social History    Tobacco Use      Smoking status: Every Day        Types: Cigarettes      Smokeless tobacco: Never      Tobacco comments: 1-2 cigarettes/day      Current Outpatient Medications:     ASPIRIN 81 PO, aspirin 81 mg tablet  Daily, Disp: , Rfl:     busPIRone (BUSPAR) 5 MG tablet, Take 1 tablet by mouth 3 (Three) Times a Day., Disp: , Rfl:     clobetasol (TEMOVATE) 0.05 % ointment, Apply 1 application  topically to the appropriate area as directed 2 (Two) Times a Day., Disp: 30 g, Rfl: 2    dexlansoprazole (Dexilant) 60 MG capsule, Dexilant 60 mg capsule, delayed release  TAKE ONE CAPSULE BY MOUTH DAILY, Disp: , Rfl:     escitalopram (LEXAPRO) 20 MG tablet, escitalopram 20 mg tablet  TAKE ONE TABLET BY MOUTH DAILY, Disp: , Rfl:     levothyroxine (SYNTHROID, LEVOTHROID) 137 MCG tablet, , Disp: , Rfl:     nystatin (MYCOSTATIN) 839967 UNIT/GM ointment, Apply 1 application topically to the appropriate area as directed 2 (Two) Times a Day., Disp: 30 g, Rfl: 3    simvastatin (ZOCOR) 20 MG tablet, simvastatin 20 mg tablet  TAKE ONE TABLET BY  MOUTH EVERY NIGHT AT BEDTIME, Disp: , Rfl:     Allergies   Allergen Reactions    Penicillins Hives    Bupropion Other (See Comments)       Review of Systems   Genitourinary:  Positive for vaginal bleeding.   All other systems reviewed and are negative.        Objective     Vital Signs: See nursing documentation   General: well developed; well nourished  no acute distress   Mental status: Alert and oriented   Heart: Not performed.   Lungs: breathing is unlabored   Abdomen: soft, non-tender; no masses  no umbilical or inguinal hernias are present   Pelvis: Not performed.        Assessment   PMB  Endometrial polyps      Plan   Diagnostic hysteroscopy Myosure resection of intracavitary pathology  I have previously discussed with Ricarda the risks of her hysteroscopy with possible Myosure resection of any intracavitary pathology. Risks discussed included intraoperative bleeding, infection at the site of surgery and damage to the adjacent surrounding organs. Additionally, I explained the small risk for reoperation in the event of unanticipated bleeding or surgical injury.  Finally the risks of cerebral and/or pulmonary edema related to excess absorption of the distending fluid & the small chance the procedure could not be completed if there was an excessive mismatch of fluid infused vs. fluid recovered were explained.        Sandy Nix MD       (Pt's PCP is Jayde Elkins MD)

## 2024-02-09 ENCOUNTER — OUTSIDE FACILITY SERVICE (OUTPATIENT)
Dept: OBSTETRICS AND GYNECOLOGY | Facility: CLINIC | Age: 74
End: 2024-02-09
Payer: MEDICARE

## 2024-02-09 ENCOUNTER — LAB REQUISITION (OUTPATIENT)
Dept: LAB | Facility: HOSPITAL | Age: 74
End: 2024-02-09
Payer: MEDICARE

## 2024-02-09 DIAGNOSIS — N95.0 POSTMENOPAUSAL BLEEDING: ICD-10-CM

## 2024-02-09 PROCEDURE — 88305 TISSUE EXAM BY PATHOLOGIST: CPT | Performed by: STUDENT IN AN ORGANIZED HEALTH CARE EDUCATION/TRAINING PROGRAM

## 2024-02-09 PROCEDURE — 88342 IMHCHEM/IMCYTCHM 1ST ANTB: CPT | Performed by: STUDENT IN AN ORGANIZED HEALTH CARE EDUCATION/TRAINING PROGRAM

## 2024-02-09 PROCEDURE — 88360 TUMOR IMMUNOHISTOCHEM/MANUAL: CPT | Performed by: STUDENT IN AN ORGANIZED HEALTH CARE EDUCATION/TRAINING PROGRAM

## 2024-02-09 PROCEDURE — 88341 IMHCHEM/IMCYTCHM EA ADD ANTB: CPT | Performed by: STUDENT IN AN ORGANIZED HEALTH CARE EDUCATION/TRAINING PROGRAM

## 2024-02-09 RX ORDER — IBUPROFEN 600 MG/1
600 TABLET ORAL EVERY 6 HOURS PRN
Qty: 60 TABLET | Refills: 1 | Status: SHIPPED | OUTPATIENT
Start: 2024-02-09

## 2024-02-09 RX ORDER — DOCUSATE SODIUM 100 MG/1
100 CAPSULE, LIQUID FILLED ORAL 2 TIMES DAILY
Qty: 60 CAPSULE | Refills: 1 | Status: SHIPPED | OUTPATIENT
Start: 2024-02-09

## 2024-02-13 DIAGNOSIS — C54.1 ENDOMETRIAL CANCER: Primary | ICD-10-CM

## 2024-02-13 LAB
CYTO UR: NORMAL
LAB AP CASE REPORT: NORMAL
LAB AP CLINICAL INFORMATION: NORMAL
LAB AP SPECIAL STAINS: NORMAL
PATH REPORT.ADDENDUM SPEC: NORMAL
PATH REPORT.FINAL DX SPEC: NORMAL
PATH REPORT.GROSS SPEC: NORMAL

## 2024-02-16 ENCOUNTER — TELEPHONE (OUTPATIENT)
Dept: OBSTETRICS AND GYNECOLOGY | Facility: CLINIC | Age: 74
End: 2024-02-16
Payer: MEDICARE

## 2024-02-16 DIAGNOSIS — R30.0 DYSURIA: Primary | ICD-10-CM

## 2024-02-16 RX ORDER — NITROFURANTOIN 25; 75 MG/1; MG/1
100 CAPSULE ORAL 2 TIMES DAILY
Qty: 14 CAPSULE | Refills: 0 | Status: SHIPPED | OUTPATIENT
Start: 2024-02-16 | End: 2024-02-23

## 2024-02-16 NOTE — TELEPHONE ENCOUNTER
High pt called stating she has an appt with Dr. Phoenix on Monday 2/19/2024 and wants to know if she needs to keep her post op from surgery on Friday 2/23/2024? She also states that if she gets up in the middle of the night to urinate it is uncomfortable for her. Please advise

## 2024-02-16 NOTE — TELEPHONE ENCOUNTER
Yes I can still see her on 2/23. I can call in a rx for Macrobid if she thinks she has a UTI, but I will need her to leave a urine sample if possible. Orders have been placed.     Sandy Nix MD

## 2024-02-19 ENCOUNTER — PREP FOR SURGERY (OUTPATIENT)
Dept: OTHER | Facility: HOSPITAL | Age: 74
End: 2024-02-19
Payer: MEDICARE

## 2024-02-19 ENCOUNTER — LAB (OUTPATIENT)
Dept: LAB | Facility: HOSPITAL | Age: 74
End: 2024-02-19
Payer: MEDICARE

## 2024-02-19 ENCOUNTER — OFFICE VISIT (OUTPATIENT)
Dept: GYNECOLOGIC ONCOLOGY | Facility: CLINIC | Age: 74
End: 2024-02-19
Payer: MEDICARE

## 2024-02-19 VITALS
OXYGEN SATURATION: 98 % | TEMPERATURE: 98.4 F | BODY MASS INDEX: 34.47 KG/M2 | DIASTOLIC BLOOD PRESSURE: 98 MMHG | SYSTOLIC BLOOD PRESSURE: 162 MMHG | HEIGHT: 67 IN | RESPIRATION RATE: 20 BRPM | HEART RATE: 72 BPM | WEIGHT: 219.6 LBS

## 2024-02-19 DIAGNOSIS — C54.1 ENDOMETRIAL CANCER: Primary | ICD-10-CM

## 2024-02-19 DIAGNOSIS — E03.9 HYPOTHYROIDISM, UNSPECIFIED TYPE: ICD-10-CM

## 2024-02-19 DIAGNOSIS — K44.9 HIATAL HERNIA: ICD-10-CM

## 2024-02-19 DIAGNOSIS — R63.4 UNINTENDED WEIGHT LOSS: ICD-10-CM

## 2024-02-19 DIAGNOSIS — F34.1 PERSISTENT DEPRESSIVE DISORDER: ICD-10-CM

## 2024-02-19 DIAGNOSIS — C54.1 ENDOMETRIAL CANCER: ICD-10-CM

## 2024-02-19 LAB
ALBUMIN SERPL-MCNC: 4.1 G/DL (ref 3.5–5.2)
ALBUMIN/GLOB SERPL: 1.2 G/DL
ALP SERPL-CCNC: 103 U/L (ref 39–117)
ALT SERPL W P-5'-P-CCNC: 13 U/L (ref 1–33)
ANION GAP SERPL CALCULATED.3IONS-SCNC: 8 MMOL/L (ref 5–15)
AST SERPL-CCNC: 16 U/L (ref 1–32)
BASOPHILS # BLD AUTO: 0.03 10*3/MM3 (ref 0–0.2)
BASOPHILS NFR BLD AUTO: 0.4 % (ref 0–1.5)
BILIRUB SERPL-MCNC: 0.5 MG/DL (ref 0–1.2)
BUN SERPL-MCNC: 9 MG/DL (ref 8–23)
BUN/CREAT SERPL: 11.8 (ref 7–25)
CALCIUM SPEC-SCNC: 9.3 MG/DL (ref 8.6–10.5)
CHLORIDE SERPL-SCNC: 103 MMOL/L (ref 98–107)
CO2 SERPL-SCNC: 26 MMOL/L (ref 22–29)
CREAT SERPL-MCNC: 0.76 MG/DL (ref 0.57–1)
DEPRECATED RDW RBC AUTO: 44 FL (ref 37–54)
EGFRCR SERPLBLD CKD-EPI 2021: 82.9 ML/MIN/1.73
EOSINOPHIL # BLD AUTO: 0.05 10*3/MM3 (ref 0–0.4)
EOSINOPHIL NFR BLD AUTO: 0.7 % (ref 0.3–6.2)
ERYTHROCYTE [DISTWIDTH] IN BLOOD BY AUTOMATED COUNT: 12.9 % (ref 12.3–15.4)
GLOBULIN UR ELPH-MCNC: 3.3 GM/DL
GLUCOSE SERPL-MCNC: 101 MG/DL (ref 65–99)
HCT VFR BLD AUTO: 43.6 % (ref 34–46.6)
HGB BLD-MCNC: 14.4 G/DL (ref 12–15.9)
IMM GRANULOCYTES # BLD AUTO: 0.02 10*3/MM3 (ref 0–0.05)
IMM GRANULOCYTES NFR BLD AUTO: 0.3 % (ref 0–0.5)
LYMPHOCYTES # BLD AUTO: 1.72 10*3/MM3 (ref 0.7–3.1)
LYMPHOCYTES NFR BLD AUTO: 23.3 % (ref 19.6–45.3)
MCH RBC QN AUTO: 29.9 PG (ref 26.6–33)
MCHC RBC AUTO-ENTMCNC: 33 G/DL (ref 31.5–35.7)
MCV RBC AUTO: 90.6 FL (ref 79–97)
MONOCYTES # BLD AUTO: 0.43 10*3/MM3 (ref 0.1–0.9)
MONOCYTES NFR BLD AUTO: 5.8 % (ref 5–12)
NEUTROPHILS NFR BLD AUTO: 5.13 10*3/MM3 (ref 1.7–7)
NEUTROPHILS NFR BLD AUTO: 69.5 % (ref 42.7–76)
PLATELET # BLD AUTO: 232 10*3/MM3 (ref 140–450)
PMV BLD AUTO: 10.1 FL (ref 6–12)
POTASSIUM SERPL-SCNC: 3.8 MMOL/L (ref 3.5–5.2)
PROT SERPL-MCNC: 7.4 G/DL (ref 6–8.5)
RBC # BLD AUTO: 4.81 10*6/MM3 (ref 3.77–5.28)
SODIUM SERPL-SCNC: 137 MMOL/L (ref 136–145)
T4 FREE SERPL-MCNC: 2.72 NG/DL (ref 0.93–1.7)
TSH SERPL DL<=0.05 MIU/L-ACNC: 0.01 UIU/ML (ref 0.27–4.2)
WBC NRBC COR # BLD AUTO: 7.38 10*3/MM3 (ref 3.4–10.8)

## 2024-02-19 PROCEDURE — 84439 ASSAY OF FREE THYROXINE: CPT

## 2024-02-19 PROCEDURE — 1159F MED LIST DOCD IN RCRD: CPT | Performed by: OBSTETRICS & GYNECOLOGY

## 2024-02-19 PROCEDURE — 1160F RVW MEDS BY RX/DR IN RCRD: CPT | Performed by: OBSTETRICS & GYNECOLOGY

## 2024-02-19 PROCEDURE — 99205 OFFICE O/P NEW HI 60 MIN: CPT | Performed by: OBSTETRICS & GYNECOLOGY

## 2024-02-19 PROCEDURE — 85025 COMPLETE CBC W/AUTO DIFF WBC: CPT

## 2024-02-19 PROCEDURE — 1126F AMNT PAIN NOTED NONE PRSNT: CPT | Performed by: OBSTETRICS & GYNECOLOGY

## 2024-02-19 PROCEDURE — 84443 ASSAY THYROID STIM HORMONE: CPT

## 2024-02-19 PROCEDURE — 36415 COLL VENOUS BLD VENIPUNCTURE: CPT

## 2024-02-19 PROCEDURE — 80053 COMPREHEN METABOLIC PANEL: CPT

## 2024-02-19 RX ORDER — SODIUM CHLORIDE 0.9 % (FLUSH) 0.9 %
10 SYRINGE (ML) INJECTION EVERY 12 HOURS SCHEDULED
Status: CANCELLED | OUTPATIENT
Start: 2024-02-19

## 2024-02-19 RX ORDER — SODIUM CHLORIDE 0.9 % (FLUSH) 0.9 %
10 SYRINGE (ML) INJECTION AS NEEDED
Status: CANCELLED | OUTPATIENT
Start: 2024-02-19

## 2024-02-19 RX ORDER — PREGABALIN 150 MG/1
150 CAPSULE ORAL ONCE
Status: CANCELLED | OUTPATIENT
Start: 2024-02-19 | End: 2024-02-19

## 2024-02-19 RX ORDER — HEPARIN SODIUM 5000 [USP'U]/ML
5000 INJECTION, SOLUTION INTRAVENOUS; SUBCUTANEOUS ONCE
Status: CANCELLED | OUTPATIENT
Start: 2024-02-19 | End: 2024-02-19

## 2024-02-19 RX ORDER — CELECOXIB 200 MG/1
200 CAPSULE ORAL ONCE
Status: CANCELLED | OUTPATIENT
Start: 2024-02-19 | End: 2024-02-19

## 2024-02-19 RX ORDER — SCOLOPAMINE TRANSDERMAL SYSTEM 1 MG/1
1 PATCH, EXTENDED RELEASE TRANSDERMAL CONTINUOUS
Status: CANCELLED | OUTPATIENT
Start: 2024-02-19 | End: 2024-02-22

## 2024-02-19 RX ORDER — ACETAMINOPHEN 500 MG
1000 TABLET ORAL ONCE
Status: CANCELLED | OUTPATIENT
Start: 2024-02-19 | End: 2024-02-19

## 2024-02-19 RX ORDER — SODIUM CHLORIDE 9 MG/ML
40 INJECTION, SOLUTION INTRAVENOUS AS NEEDED
Status: CANCELLED | OUTPATIENT
Start: 2024-02-19

## 2024-02-19 NOTE — PROGRESS NOTES
"Ricarda Alvarez  [id number]  1950      Reason for visit:  EAC grade 2    Consultation:  Patient is being seen at the request of Sandy Nix MD     History of present illness:  The patient is a 73 y.o. year old female who presents today for treatment and evaluation of the above issues.  Developed single episode of VB after menopause at age of 50. Called Corie Patel for appointment.   Mainly bloody d/c.  Had to reschedule D&C due to GI symptoms.  She presented to Dr. Nix with concerns for postmenopausal bleeding. TVUS had demonstrated thickened endometrium, so she underwent hysteroscopy, &C, and polypectomy on 2/9/2024 with pathology demonstrating Grade 2 EAC, at which point she was referred to our clinic for evaluation. Patient reports weight loss - initially intentional x 40 # - due to fatty liver.  She then developed depression and problems eating d/t hiatal hernia with a feeling of a \"knot\" and changes in BM.  She has lost a total of 70#.  Unsure if she is still losing weight.  Smokes 1 cigarette/day.  Chronic productive cough.  Has a history of sleep apnea but is noncompliant with CPAP.  Sleeps in recliner often.  Complains of neuropathy bilateral lower extremities and some in hands.  Has not really addressed this  Currently, dysuria d/t UTI.  But taking antibiotics and doing better.     For new patients, [facility] intake form from 2/19/2024 was reviewed and confirmed.    OBGYN History:   She does not use HRT. She does have a history of abnormal pap smears, had colposcopy and observation with resolution.    Oncologic History:  Oncology/Hematology History    No history exists.         Past Medical History:   Diagnosis Date    Abnormal Pap smear of cervix     Anxiety     Depression     Family history of breast cancer in sister     GERD (gastroesophageal reflux disease)     HPV (human papilloma virus) infection     Hyperlipidemia     Hypothyroidism     Joint pain     hio    Menopause     Palpitations        Past " Surgical History:   Procedure Laterality Date    APPENDECTOMY       SECTION         MEDICATIONS:    Current Outpatient Medications:     ASPIRIN 81 PO, aspirin 81 mg tablet  Daily, Disp: , Rfl:     busPIRone (BUSPAR) 5 MG tablet, Take 1 tablet by mouth 3 (Three) Times a Day., Disp: , Rfl:     clobetasol (TEMOVATE) 0.05 % ointment, Apply 1 application  topically to the appropriate area as directed 2 (Two) Times a Day., Disp: 30 g, Rfl: 2    dexlansoprazole (Dexilant) 60 MG capsule, Dexilant 60 mg capsule, delayed release  TAKE ONE CAPSULE BY MOUTH DAILY, Disp: , Rfl:     escitalopram (LEXAPRO) 20 MG tablet, escitalopram 20 mg tablet  TAKE ONE TABLET BY MOUTH DAILY, Disp: , Rfl:     ibuprofen (ADVIL,MOTRIN) 600 MG tablet, Take 1 tablet by mouth Every 6 (Six) Hours As Needed for Mild Pain., Disp: 60 tablet, Rfl: 1    levothyroxine (SYNTHROID, LEVOTHROID) 137 MCG tablet, , Disp: , Rfl:     nitrofurantoin, macrocrystal-monohydrate, (Macrobid) 100 MG capsule, Take 1 capsule by mouth 2 (Two) Times a Day for 7 days., Disp: 14 capsule, Rfl: 0    nystatin (MYCOSTATIN) 472372 UNIT/GM ointment, Apply 1 application topically to the appropriate area as directed 2 (Two) Times a Day., Disp: 30 g, Rfl: 3    simvastatin (ZOCOR) 20 MG tablet, simvastatin 20 mg tablet  TAKE ONE TABLET BY MOUTH EVERY NIGHT AT BEDTIME, Disp: , Rfl:      Allergies:  is allergic to penicillins and bupropion.    Social History:   Social History     Socioeconomic History    Marital status:    Tobacco Use    Smoking status: Every Day     Types: Cigarettes    Smokeless tobacco: Never    Tobacco comments:     1-2 cigarettes/day   Vaping Use    Vaping Use: Never used   Substance and Sexual Activity    Alcohol use: Not Currently    Drug use: Never    Sexual activity: Not Currently     Birth control/protection: Post-menopausal       Family History:    Family History   Problem Relation Age of Onset    COPD Father     Coronary artery disease Mother      "Rheum arthritis Brother     Diabetes Brother     Breast cancer Sister     Rheum arthritis Son        Health Maintenance:    Health Maintenance   Topic Date Due    BMI FOLLOWUP  Never done    Pneumococcal Vaccine 65+ (1 of 2 - PCV) Never done    RSV Vaccine - Adults (1 - 1-dose 60+ series) Never done    HEPATITIS C SCREENING  Never done    ANNUAL WELLNESS VISIT  Never done    LIPID PANEL  Never done    COVID-19 Vaccine (4 - 2023-24 season) 09/01/2023    MAMMOGRAM  10/25/2024    DXA SCAN  06/16/2025    TDAP/TD VACCINES (5 - Td or Tdap) 06/12/2029    COLORECTAL CANCER SCREENING  11/20/2029    INFLUENZA VACCINE  Completed    ZOSTER VACCINE  Completed       Review of Systems:  Please refer to history of present illness.  Review of systems otherwise negative.    Physical Exam:  Vitals:    02/19/24 1256   BP: 162/98   Pulse: 72   Resp: 20   Temp: 98.4 °F (36.9 °C)   TempSrc: Temporal   SpO2: 98%   Weight: 99.6 kg (219 lb 9.6 oz)   Height: 170.2 cm (67\")   PainSc: 0-No pain     Body mass index is 34.39 kg/m².  Wt Readings from Last 3 Encounters:   02/19/24 99.6 kg (219 lb 9.6 oz)   01/24/24 104 kg (230 lb)   12/15/23 109 kg (240 lb 6.4 oz)     PHQ-2 Depression Screening  Little interest or pleasure in doing things? 2-->more than half the days   Feeling down, depressed, or hopeless? 2-->more than half the days   PHQ-2 Total Score 13     GENERAL: Alert, well-appearing female appearing her stated age who is in no apparent distress.   HEENT: Sclera anicteric. Head normocephalic, atraumatic. Mucus membranes moist.   NECK: Trachea midline, supple, without masses.  No thyromegaly.   BREASTS: Deferred  CARDIOVASCULAR: Normal rate, regular rhythm, no murmurs, rubs, or gallops.  No peripheral edema.  RESPIRATORY: Clear to auscultation bilaterally, normal respiratory effort  BACK:  No CVA tenderness, no vertebral tenderness on palpation  GASTROINTESTINAL:  Abdomen is soft, non-tender, non-distended, no rebound or guarding, no masses, " "+ hernias. No HSM.   SKIN:  Warm, dry, well-perfused.  All visible areas intact.  No rashes, lesions, ulcers.  PSYCHIATRIC: AO x3, with appropriate affect, normal thought processes.  NEUROLOGIC: No focal deficits.  Moves extremities well.  MUSCULOSKELETAL: Normal gait and station.   EXTREMITIES:   No cyanosis, clubbing, symmetric.  LYMPHATICS:  No cervical or inguinal adenopathy noted.     PELVIC exam:  External genitalia are free from lesion. On speculum examination, the cervix was free from lesion. On bimanual examination no mass was appreciated.  Uterus was normal in size and shape. There is no cervical motion or uterine tenderness. No cervical mass was palpated. Parametria were smooth. Rectovaginal exam was deferred.     ECOG PS 0    PROCEDURES:  none    Diagnostic Data:    CT Abdomen Pelvis With Contrast    Result Date: 1/24/2024  Impression: 1.No acute abnormality identified within the abdomen or pelvis. 2.Colonic diverticulosis. 3.Endometrium appears prominent for age. Please correlate with findings on recent pelvic ultrasound. 4.Fat-containing umbilical hernia. 5.16 mm soft tissue focus within the left breast (series 2, image 12) is felt to represent fibroglandular tissue. Correlation with mammography may be beneficial if not recently performed. Electronically Signed: Alexi Huber MD  1/24/2024 10:52 AM EST  Workstation ID: [id number]      Lab Results   Component Value Date    WBC 6.92 01/24/2024    HGB 15.5 01/24/2024    HCT 46.4 01/24/2024    MCV 91.5 01/24/2024     01/24/2024    NEUTROABS 5.05 01/24/2024    GLUCOSE 117 (H) 01/24/2024    BUN 10 01/24/2024    CREATININE 0.80 01/24/2024     01/24/2024    K 3.4 (L) 01/24/2024     01/24/2024    CO2 22.0 01/24/2024    CALCIUM 9.2 01/24/2024    ALBUMIN 4.1 01/24/2024    AST 15 01/24/2024    ALT 12 01/24/2024    BILITOT 0.8 01/24/2024     No results found for: \"\"      Assessment & Plan   This is a 73 y.o. woman with newly diagnosed Grade " 2 EAC.   Encounter Diagnoses   Name Primary?    Endometrial cancer Yes    Persistent depressive disorder     Unintended weight loss     Hiatal hernia     Hypothyroidism, unspecified type      Endometrial cancer  Clinical stage I ([id number]) grade 2 diagnosed at the time of dilation and curettage.  Patient was consented for injection of ICG dye, robot assisted total laparoscopic hysterectomy bilateral salpingo-oophorectomy sentinel/complete lymph node dissection.  Risks and benefits of surgery were discussed.  This included, but was not limited to, infection and bleeding like when the skin is cut; damage to surrounding structures; and incisional complications.  Risk of DVT was addressed for major surgeries.  Standard of care efforts to minimize these risks were reviewed.  Typical hospital stay and recovery were discussed as well as post-procedure precautions.  Surgical implications of chronic illnesses on recovery and surgical outcome were reviewed.   Pain medication regimen for postoperative care was discussed.  Typical regimen and avoidance of narcotics was discussed.  Patient was educated that other factors, such as existing narcotic use, can impact postoperative pain management.    Risks and benefits of lymph node dissection were further discussed.  This included lymphocyst, hematoma, lymphedema, vascular injury, and nerve injury.  Patient verbalized understanding of the plan including the risks and benefits.  Appropriate perioperative testing including laboratory evaluation, EKG as clinically indicated, chest x-ray as clinically indicated, and preadmission evaluation were all ordered as a part of this patient's care.  Discussed CPAP compliance postoperatively and that she may have increased sedation.  If she is not going to use her CPAP she should certainly sleep in a semiupright position such as in her recliner or with the head of the bed up as she has an adjustable bed.  History of hypothyroidism  Intentional  "then subsequent and intentional weight loss  Longstanding depression, on antidepressants.  Denies suicidal ideation today.  Check thyroid function studies  Currently on Synthroid  Referral made for counseling.  This was made outside of the cancer center as depression seems to be a long-term issue for her.    Hiatal hernia  Patient denies heartburn  Complains of \"knot\" when she eats but denies difficulty swallowing  Last GI evaluation 1 to 2 years ago.  Offered referral to GI medicine either at [facility] or [facility] and patient declined.  She states she is seeing her primary care provider in the near future and would prefer for her primary care provider to address this.    Comorbidities not listed above  Obstructive sleep apnea, noncompliance with CPAP  Obesity  Tobacco abuse  Dyslipidemia  Current UTI, on antibiotics with symptoms resolved    Pain assessment was performed today as a part of patient’s care.  For patients with pain related to surgery, gynecologic malignancy or cancer treatment, the plan is as noted in the assessment/plan.  For patients with pain not related to these issues, they are to seek any further needed care from a more appropriate provider, such as PCP.      Orders Placed This Encounter   Procedures    Ambulatory Referral to Psychotherapy     Referral Priority:   Routine     Referral Type:   Behavorial Health/Psych     Referral Reason:   Specialty Services Required     Requested Specialty:   Psychiatry     Number of Visits Requested:   1       FOLLOW UP: surgery    I spent 60 minutes caring for Ricarda on this date of service. This time includes time spent by me in the following activities: preparing for the visit, reviewing tests, performing a medically appropriate examination and/or evaluation, counseling and educating the patient/family/caregiver, ordering medications, tests, or procedures, referring and communicating with other health care professionals, documenting information in the " medical record, and care coordination    Patient was seen and examined with Dr. Contreras,  resident, who performed portions of the examination and documentation for this patient's care under my direct supervision.  I agree with the above documentation and plan.    Donna Phoenix MD  02/19/24  14:34 EST

## 2024-02-19 NOTE — PATIENT INSTRUCTIONS
Surgery Instructions            Ricarda Alvarez  [id number]  1950      SURGEON:  Donna Phoenix MD    Surgery Coordinator: Mariposa HUTCHINSON    Gynecological Oncology  [address] [facility] [address] [place] [address], [address]  Phone: [phone]                   Fax: [phone]          Surgery Appointment      Your surgery has been scheduled on 2/26/2024 .  You will need to go to Main Registration to check in at 8:00 AM . Then you will be sent to the [address] [place] [address] floor surgery registration desk to check in.    Nothing by mouth after midnight on 02/25/2024  .    If you are feeling sick, have a fever or cough and have seen your PCP let our office know 48 hours prior to surgery. It may be subject to rescheduling.       The Day of Surgery:    Do not chew gum or tobacco, smoke, or eat mints or hard candy. Shower and wash your hair. You may brush your teeth but do not swallow water. Use any wipes that Pre-admission testing has given you.     Please arrive for surgery as instructed by the pre-op nurse, often one to two hours before your surgery.  Once you are called to go to your pre-op room, no one will be allowed in the pre op room.   Please note no one under age 12 is permitted to stay in the waiting area without supervision.  Remove all jewelry, including rings and piercings. Do not bring valuables to the hospital.  Wear loose-fitting clothing.  Avoid wearing eye makeup or contact lenses  We make every effort to begin surgery at your scheduled start time but delays do occur. We will keep you and your family updated about any delays  Please note: you MUST have a  over the age of 18 to drive you home from the hospital. You may not use Uber, Lyft or a taxi.    Please remember to bring:    Photo ID and current medical insurance card  Advanced directives, living will or power of  (if applicable)  Current list of all medications, including over-the- counter and herbal  supplements  List of allergies  CPAP device if you have sleep apnea  Any assistive devices or equipment needed after surgery    While You are In the Pre-Op Room:  The nurse will review your health history and will place an IV (into the vein) in your hand or arm for fluids and medicines.  An anesthesia provider will talk with you about anesthesia and pain control during and after surgery.  A member of the surgical team can answer your questions.    Directions to [facility]  [address] [place] [address] ? [place], [place] [address] ? [phone]    From [address] and [address] [place] [facility]:  Take [address] to the [employer] exit. Go right on [address] to [address]. Right on [address] to [facility].   Left on [address] to [facility] which is on the left.    From [address] [facility]:  Get off [address] at the [address] exit. Go left on [address] to [address]. Right on [address] to [facility]. Left on   [address] to [facility] which is on the left.     From the South ([address]):  Follow [address] to approximately one mile inside [facility]. [facility] is on the right at [facility] and   [facility].     Parking:  Free  Parking - Take Entrance 2 off of [address] and go straight ahead to [address] [place] [address].  Self Parking - Take Entrance 1 off of [address], bear left and follow the road to [facility].    Directions to Registration:  If entering through front of [address] [place] [address] ( parking), take a right and proceed up the hallway connecting [address] [place] [address] to   [address] [place] [address]. Registration is on the left about [facility] up the whaley.    If entering from [facility], take garage elevator to first floor (1), exit to the right and proceed through the doors to outside, follow the covered sidewalk to entrance of [facility], follow signs to [address] [place] [address], this leads to the [facility] lobby and information desk.  "Proceed past the information desk to the hallway that connects [address] [place] [address] to the [facility]. Registration is on the left about skilled nursing up the whaley.    Directions to Pre-admission Testing:  Follow directions to Registration and Pre-admission Testing is next door to Registration             PREPARING FOR SURGERY  **Disability or Work Release Forms     Work: The amount of time you will be off work after surgery depends on both your surgery and your job. Discuss this with your doctor before surgery. If you have any questions about this, call your doctor.  You must provide all forms completed and signed to the GYN ONCOLOGY office.    FORM FOR AUHTHORIZATION FOR USE AND/OR DISCLOSURE OF PROCTED HEALTH INFORMATION CAN BE PROVIDED UPON REQUEST.    Preoperative Evaluation and Optimization  If your doctor tells you to get a preoperative evaluation from your primary care provider, cardiologist, or other specialist, it is your responsibility to make sure to complete these well before your surgery. We want you to get evaluated to make sure you are as healthy as possible when you have your surgery. If the evaluation, including all recommended testing, is not done in time, your surgery will be postponed.    If you take diabetic medications please consult with the prescriber.  Continue antidepressants, Beta Blockers \"olol\", anti-seizure medication, GERD medication (heartburn), Opioids and Parkinson's medication.  Let us know if you have a history of blood clots or are taking a blood thinner before your surgery, this will need to be held and you will need to discuss this with staff.   If you are taking any weight loss medications please let our staff now. Ideally they will need to be held 2 weeks prior to your procedure.  You are allowed 1 visitor that may remain in the waiting room at both locations.  Visitors cannot come back to pre-op or post-op areas.    Please note: you MUST have a  over the age of 18 to drive you " home from the hospital. You may not use Uber, Lyft or a taxi.    Physical Fitness  Research shows that getting more physical activity before surgery can lower your risk for problems after surgery. Walking is a great way to improve your fitness level before surgery. Even if you start walking just a few weeks before surgery, it can make a big difference.     Quit Smoking  If you smoke, your risk of having a lung problem is at least twice that of a non-smoker.    Surgical incisions will not heal as well and you have a higher risk of infection  The heart has to work harder.  It is best to quit smoking 6 to 8 weeks before surgery. This gives your lungs more time to recover.    Outpatient Surgery  You will need to have someone bring you to the hospital, stay in the waiting room during your procedure and take you home at discharge. It is recommended that someone stay with you 24 hours after your procedure.     If you live more than a 4-hour drive away from the hospital, or live in an area without easy access to an emergency department, we recommend you plan to spend another night or two close to the hospital before you go home. For assistance with hotel, prices and vouchers let our office know and we can let you talk with our Oncology Social worker, Lizzie Trujillo.     Post-Operative Visit  You will be scheduled a post-operative appointment for 3 weeks after your surgical procedure. If you do not have an appointment please call the office and have that scheduled.     How to prevent nausea  The best way to prevent nausea is to eat frequent small meals. It is especially important to eat something before taking pain medication. Take your ondansetron if you are feeling nauseous do not wait.    Pain Management after Surgery    If you have kidney disease or liver disease and are not to take ibuprofen or Tylenol please let your doctor or nurse know.     Driving: Do not drive while you are taking prescription pain medications.      It is normal to have some pain after surgery. The goal of managing your acute pain after surgery is to minimize your pain so you feel comfortable enough to get up, take deep breaths, wash, get dressed, and do simple tasks in your home. Some discomfort is likely. We do not expect you to be completely free of pain.   Pain is usually worst the first 24-48 hours after surgery.    What can I do to relieve pain without medications?   Apply heat with a warm compress, hot water bottle, or heating pad. Do not put anything hot directly on your skin or lie on top of it.   Apply cooling with a cold gel pack, bag of peas, or crushed ice. Wrap in a soft cloth or towel.   Do not push or press on your incision. It is normal for your incision to be sore for up to 6 weeks if you push on it.   Unless your doctor gives you a different plan, ibuprofen and acetaminophen are the main medicines you will use to manage your pain.   You may also get a prescription for an opioid such as oxycodone or hydrocodone. Opioids should only be added as needed to reduce pain that is not adequately relieved by ibuprofen and acetaminophen.                                                                                         Typical Pain Medication Schedule  6 am Ibuprofen 600 mg   9 am acetaminophen 650 mg   12:00 pm Noon Ibuprofen 600 mg   3:00 pm Acetaminophen 650 mg   6:00 pm Ibuprofen 600 mg   9:00 pm Acetaminophen 650 mg   12:00 am Midnight  Ibuprofen 600 mg.      What if this schedule does not control my pain?   Please call the office and let us know at [phone]  Reduce the number and frequency of opioids as soon as you can. Do not take more opioid medication than your doctor has prescribed.   Common side effects and risks of opioids include drowsiness, mental confusion, dizziness, nausea, constipation, itching, dry mouth, and slowed breathing.   Never mix opioids with alcohol, sleep aids or anti-anxiety medications. These are dangerous  combinations that increase the harmful effects of opioid pain medication. Many overdose deaths from opioids also involve at least one other drug or alcohol.   It is illegal to sell or share an opioid without a prescription properly issued by a licensed health care prescriber.    What is the best way to stop taking pain medications?  1. Stop opioid use.  2. Stop acetaminophen.  3. Gradually decrease how often you take ibuprofen. It is a good idea to take a 600 mg pill before you start a more tiring activity such as going shopping or for a long walk.  Once you get more active, you may have a day when your pain gets a little worse. If this happens, take ibuprofen. If ibuprofen does not relieve the pain, add acetaminophen.    What do I need to know about bowel movements?   Starting as soon as you get home, take 17 grams of Miralax (one capful) twice a day to keep your stool soft and prevent constipation. It is important to prevent constipation because straining can damage your stitches. Your stool should be as soft as toothpaste. If your stool gets too loose, cut back to using Miralax only once a day.   If you used a bowel prep before surgery, it is common not to have a bowel movement on the first and second day after surgery.   If you have not had a bowel movement by 7 p.m. on the third day after surgery, do one of the following at bedtime:  Drink 1 ounce (2 tablespoons) of Milk of Magnesia (MOM). If you have used MOM before and know you need to take 2 ounces for it to work for you, it is OK to do this, or Take 2 Senekot tablets.   Go for short walks. Walking and being active will help you have a bowel movement.   If you have not had a bowel movement by noon on the fourth day after surgery, call the clinic where you were seen and ask to speak with a nurse.    What kind of vaginal bleeding is normal?  Spotting of pink or red blood from the vagina is normal. Brown-colored discharge that gradually changes to a light  yellow or cream color is also normal and can last up to 6 weeks. The brownish discharge is old blood and often has a strong odor, this is okay. Call us if it becomes heavier or foul smelling or you are saturating a maxi pad within an hour.     At Home after Surgery: If you experience a medical emergency call 911 or have someone drive you to your nearest emergency department.     When should I call my doctor?  Call your doctor right away, any time of the day or night, including on weekends and holidays, if you have any of the following signs or symptoms:   A temperature over 100.4°F (38°C) If you don't have one, please buy a thermometer before your surgery.   Heavy bleeding (soaking a regular pad in an hour or less)   Severe pain in your abdomen or pelvis that the pain medication is not helping   Chest pain or difficulty breathing   Swelling, redness, or pain in your legs   An incision that opens   An incision that is red or hot   Fluid or blood leaking from an incision   New bruising after leaving the hospital that is large or spreading. A little bit of bruising around an incision is normal.   Nausea and vomiting    Skin rash   Unable to urinate at all   Pain or stinging when you pass urine   Blood or cloudiness in your urine   Non-stop urge to pass urine, but only dribbling when you try to go   A sense that something is wrong.    Caring for post-surgical incisions     Do not have vaginal intercourse until your doctor evaluates you at a postop visit and tells you OK.     Showers: You may shower starting 24 hours after your surgery.    NO BATHS: do not take a tub bath up to 6 weeks after surgery.   Do not put any lotion, oil, gel, or powder on or near your incisions.     For incisions inside your vagina: Incisions inside the vagina are closed with dissolvable stitches. When they dissolve you may see little bits of suture material that look like thin pieces of string on your underwear or on toilet tissue after wiping.  This is normal. Do not put anything inside the vagina until your doctor evaluates you at a postop visit and tells you when it will be OK.      For incisions on your skin: If there is a dressing over the incision, remove it before your first shower. Leave the slim adhesive strips that are under the dressing in place. During the week after surgery, they will usually curl up at the edges and then come off on their own. If they are still there a week after surgery, gently remove them.  To clean the incisions, first wash your hands, and then get your hands sudsy with soap and gently wash or let the sudsy water run down over the incisions. Dry the incisions well after washing by gently patting with a towel. You may use a blow dryer, but it must be on a low-heat setting.    When will my bladder function get back to normal?   You received extra fluid through your I.V. while you were in the hospital, so it is normal to urinate (pee) more than usual when you first get home.   It is normal for your bladder function to be different after surgery. You may notice a pause before your urine stream starts or that your urine stream is slower. This will gradually get better, but it may take up to 6 months before you are back to normal. Be patient, relax, and sit on the toilet a little longer.   Drinking more water than usual will not help the bladder recover faster.    What is a normal energy level?  It is normal to have a decreased energy level after surgery. Listen to your body. If you need to rest, do it. Give yourself permission to take it easy. Once you settle into a normal routine at home, you will find that you slowly begin to feel better. Walking around the house and taking short walks outside will help you get back to normal.    What kind of exercise/activities can I do?   Exercise is important for a healthy recovery. We encourage you to begin normal physical activity, like walking, within hours of surgery. Start with short  walks and gradually increase the distance and length of time that you walk.   Allow your body time to heal. Do not restart a difficult exercise routine until you have had your post-op exam and your doctor says it is OK.   Lifting: Unless you are given other instructions, for 6 weeks after your surgery do not lift anything over 15 pounds.   Travel: It is best if you do not go far away from home before your postop visit with your doctor. If you have travel plans, talk to your doctor about this before your surgery.      Financial Assistance:    If you have any questions or need assistance, contact your [facility] financial counseling office from 8:30 a.m.-4:30  p.m. Monday through Friday. Closed weekends.   [place]: [phone] or, or visit at [address] [facility], [address], near the entrance.  Financial Assistance Application available upon request      Patient Payments and Correspondence  Customer service representatives are available to assist you from 8:00 a.m. to 6:00 p.m. Eastern Standard Time by calling [phone] Monday through Friday. You can also contact us through [employer].    [facility]  [address]  [place] [address] [phone]

## 2024-02-19 NOTE — H&P (VIEW-ONLY)
"Ricarda Alvarez  [id number]  1950      Reason for visit:  EAC grade 2    Consultation:  Patient is being seen at the request of Sandy Nix MD     History of present illness:  The patient is a 73 y.o. year old female who presents today for treatment and evaluation of the above issues.  Developed single episode of VB after menopause at age of 50. Called Corie Patel for appointment.   Mainly bloody d/c.  Had to reschedule D&C due to GI symptoms.  She presented to Dr. Nix with concerns for postmenopausal bleeding. TVUS had demonstrated thickened endometrium, so she underwent hysteroscopy, &C, and polypectomy on 2/9/2024 with pathology demonstrating Grade 2 EAC, at which point she was referred to our clinic for evaluation. Patient reports weight loss - initially intentional x 40 # - due to fatty liver.  She then developed depression and problems eating d/t hiatal hernia with a feeling of a \"knot\" and changes in BM.  She has lost a total of 70#.  Unsure if she is still losing weight.  Smokes 1 cigarette/day.  Chronic productive cough.  Has a history of sleep apnea but is noncompliant with CPAP.  Sleeps in recliner often.  Complains of neuropathy bilateral lower extremities and some in hands.  Has not really addressed this  Currently, dysuria d/t UTI.  But taking antibiotics and doing better.     For new patients, [facility] intake form from 2/19/2024 was reviewed and confirmed.    OBGYN History:   She does not use HRT. She does have a history of abnormal pap smears, had colposcopy and observation with resolution.    Oncologic History:  Oncology/Hematology History    No history exists.         Past Medical History:   Diagnosis Date    Abnormal Pap smear of cervix     Anxiety     Depression     Family history of breast cancer in sister     GERD (gastroesophageal reflux disease)     HPV (human papilloma virus) infection     Hyperlipidemia     Hypothyroidism     Joint pain     hio    Menopause     Palpitations        Past " Surgical History:   Procedure Laterality Date    APPENDECTOMY       SECTION         MEDICATIONS:    Current Outpatient Medications:     ASPIRIN 81 PO, aspirin 81 mg tablet  Daily, Disp: , Rfl:     busPIRone (BUSPAR) 5 MG tablet, Take 1 tablet by mouth 3 (Three) Times a Day., Disp: , Rfl:     clobetasol (TEMOVATE) 0.05 % ointment, Apply 1 application  topically to the appropriate area as directed 2 (Two) Times a Day., Disp: 30 g, Rfl: 2    dexlansoprazole (Dexilant) 60 MG capsule, Dexilant 60 mg capsule, delayed release  TAKE ONE CAPSULE BY MOUTH DAILY, Disp: , Rfl:     escitalopram (LEXAPRO) 20 MG tablet, escitalopram 20 mg tablet  TAKE ONE TABLET BY MOUTH DAILY, Disp: , Rfl:     ibuprofen (ADVIL,MOTRIN) 600 MG tablet, Take 1 tablet by mouth Every 6 (Six) Hours As Needed for Mild Pain., Disp: 60 tablet, Rfl: 1    levothyroxine (SYNTHROID, LEVOTHROID) 137 MCG tablet, , Disp: , Rfl:     nitrofurantoin, macrocrystal-monohydrate, (Macrobid) 100 MG capsule, Take 1 capsule by mouth 2 (Two) Times a Day for 7 days., Disp: 14 capsule, Rfl: 0    nystatin (MYCOSTATIN) 724817 UNIT/GM ointment, Apply 1 application topically to the appropriate area as directed 2 (Two) Times a Day., Disp: 30 g, Rfl: 3    simvastatin (ZOCOR) 20 MG tablet, simvastatin 20 mg tablet  TAKE ONE TABLET BY MOUTH EVERY NIGHT AT BEDTIME, Disp: , Rfl:      Allergies:  is allergic to penicillins and bupropion.    Social History:   Social History     Socioeconomic History    Marital status:    Tobacco Use    Smoking status: Every Day     Types: Cigarettes    Smokeless tobacco: Never    Tobacco comments:     1-2 cigarettes/day   Vaping Use    Vaping Use: Never used   Substance and Sexual Activity    Alcohol use: Not Currently    Drug use: Never    Sexual activity: Not Currently     Birth control/protection: Post-menopausal       Family History:    Family History   Problem Relation Age of Onset    COPD Father     Coronary artery disease Mother      "Rheum arthritis Brother     Diabetes Brother     Breast cancer Sister     Rheum arthritis Son        Health Maintenance:    Health Maintenance   Topic Date Due    BMI FOLLOWUP  Never done    Pneumococcal Vaccine 65+ (1 of 2 - PCV) Never done    RSV Vaccine - Adults (1 - 1-dose 60+ series) Never done    HEPATITIS C SCREENING  Never done    ANNUAL WELLNESS VISIT  Never done    LIPID PANEL  Never done    COVID-19 Vaccine (4 - 2023-24 season) 09/01/2023    MAMMOGRAM  10/25/2024    DXA SCAN  06/16/2025    TDAP/TD VACCINES (5 - Td or Tdap) 06/12/2029    COLORECTAL CANCER SCREENING  11/20/2029    INFLUENZA VACCINE  Completed    ZOSTER VACCINE  Completed       Review of Systems:  Please refer to history of present illness.  Review of systems otherwise negative.    Physical Exam:  Vitals:    02/19/24 1256   BP: 162/98   Pulse: 72   Resp: 20   Temp: 98.4 °F (36.9 °C)   TempSrc: Temporal   SpO2: 98%   Weight: 99.6 kg (219 lb 9.6 oz)   Height: 170.2 cm (67\")   PainSc: 0-No pain     Body mass index is 34.39 kg/m².  Wt Readings from Last 3 Encounters:   02/19/24 99.6 kg (219 lb 9.6 oz)   01/24/24 104 kg (230 lb)   12/15/23 109 kg (240 lb 6.4 oz)     PHQ-2 Depression Screening  Little interest or pleasure in doing things? 2-->more than half the days   Feeling down, depressed, or hopeless? 2-->more than half the days   PHQ-2 Total Score 13     GENERAL: Alert, well-appearing female appearing her stated age who is in no apparent distress.   HEENT: Sclera anicteric. Head normocephalic, atraumatic. Mucus membranes moist.   NECK: Trachea midline, supple, without masses.  No thyromegaly.   BREASTS: Deferred  CARDIOVASCULAR: Normal rate, regular rhythm, no murmurs, rubs, or gallops.  No peripheral edema.  RESPIRATORY: Clear to auscultation bilaterally, normal respiratory effort  BACK:  No CVA tenderness, no vertebral tenderness on palpation  GASTROINTESTINAL:  Abdomen is soft, non-tender, non-distended, no rebound or guarding, no masses, " "+ hernias. No HSM.   SKIN:  Warm, dry, well-perfused.  All visible areas intact.  No rashes, lesions, ulcers.  PSYCHIATRIC: AO x3, with appropriate affect, normal thought processes.  NEUROLOGIC: No focal deficits.  Moves extremities well.  MUSCULOSKELETAL: Normal gait and station.   EXTREMITIES:   No cyanosis, clubbing, symmetric.  LYMPHATICS:  No cervical or inguinal adenopathy noted.     PELVIC exam:  External genitalia are free from lesion. On speculum examination, the cervix was free from lesion. On bimanual examination no mass was appreciated.  Uterus was normal in size and shape. There is no cervical motion or uterine tenderness. No cervical mass was palpated. Parametria were smooth. Rectovaginal exam was deferred.     ECOG PS 0    PROCEDURES:  none    Diagnostic Data:    CT Abdomen Pelvis With Contrast    Result Date: 1/24/2024  Impression: 1.No acute abnormality identified within the abdomen or pelvis. 2.Colonic diverticulosis. 3.Endometrium appears prominent for age. Please correlate with findings on recent pelvic ultrasound. 4.Fat-containing umbilical hernia. 5.16 mm soft tissue focus within the left breast (series 2, image 12) is felt to represent fibroglandular tissue. Correlation with mammography may be beneficial if not recently performed. Electronically Signed: Alexi Huber MD  1/24/2024 10:52 AM EST  Workstation ID: [id number]      Lab Results   Component Value Date    WBC 6.92 01/24/2024    HGB 15.5 01/24/2024    HCT 46.4 01/24/2024    MCV 91.5 01/24/2024     01/24/2024    NEUTROABS 5.05 01/24/2024    GLUCOSE 117 (H) 01/24/2024    BUN 10 01/24/2024    CREATININE 0.80 01/24/2024     01/24/2024    K 3.4 (L) 01/24/2024     01/24/2024    CO2 22.0 01/24/2024    CALCIUM 9.2 01/24/2024    ALBUMIN 4.1 01/24/2024    AST 15 01/24/2024    ALT 12 01/24/2024    BILITOT 0.8 01/24/2024     No results found for: \"\"      Assessment & Plan   This is a 73 y.o. woman with newly diagnosed Grade " 2 EAC.   Encounter Diagnoses   Name Primary?    Endometrial cancer Yes    Persistent depressive disorder     Unintended weight loss     Hiatal hernia     Hypothyroidism, unspecified type      Endometrial cancer  Clinical stage I ([id number]) grade 2 diagnosed at the time of dilation and curettage.  Patient was consented for injection of ICG dye, robot assisted total laparoscopic hysterectomy bilateral salpingo-oophorectomy sentinel/complete lymph node dissection.  Risks and benefits of surgery were discussed.  This included, but was not limited to, infection and bleeding like when the skin is cut; damage to surrounding structures; and incisional complications.  Risk of DVT was addressed for major surgeries.  Standard of care efforts to minimize these risks were reviewed.  Typical hospital stay and recovery were discussed as well as post-procedure precautions.  Surgical implications of chronic illnesses on recovery and surgical outcome were reviewed.   Pain medication regimen for postoperative care was discussed.  Typical regimen and avoidance of narcotics was discussed.  Patient was educated that other factors, such as existing narcotic use, can impact postoperative pain management.    Risks and benefits of lymph node dissection were further discussed.  This included lymphocyst, hematoma, lymphedema, vascular injury, and nerve injury.  Patient verbalized understanding of the plan including the risks and benefits.  Appropriate perioperative testing including laboratory evaluation, EKG as clinically indicated, chest x-ray as clinically indicated, and preadmission evaluation were all ordered as a part of this patient's care.  Discussed CPAP compliance postoperatively and that she may have increased sedation.  If she is not going to use her CPAP she should certainly sleep in a semiupright position such as in her recliner or with the head of the bed up as she has an adjustable bed.  History of hypothyroidism  Intentional  "then subsequent and intentional weight loss  Longstanding depression, on antidepressants.  Denies suicidal ideation today.  Check thyroid function studies  Currently on Synthroid  Referral made for counseling.  This was made outside of the cancer center as depression seems to be a long-term issue for her.    Hiatal hernia  Patient denies heartburn  Complains of \"knot\" when she eats but denies difficulty swallowing  Last GI evaluation 1 to 2 years ago.  Offered referral to GI medicine either at [facility] or [facility] and patient declined.  She states she is seeing her primary care provider in the near future and would prefer for her primary care provider to address this.    Comorbidities not listed above  Obstructive sleep apnea, noncompliance with CPAP  Obesity  Tobacco abuse  Dyslipidemia  Current UTI, on antibiotics with symptoms resolved    Pain assessment was performed today as a part of patient’s care.  For patients with pain related to surgery, gynecologic malignancy or cancer treatment, the plan is as noted in the assessment/plan.  For patients with pain not related to these issues, they are to seek any further needed care from a more appropriate provider, such as PCP.      Orders Placed This Encounter   Procedures    Ambulatory Referral to Psychotherapy     Referral Priority:   Routine     Referral Type:   Behavorial Health/Psych     Referral Reason:   Specialty Services Required     Requested Specialty:   Psychiatry     Number of Visits Requested:   1       FOLLOW UP: surgery    I spent 60 minutes caring for Ricarda on this date of service. This time includes time spent by me in the following activities: preparing for the visit, reviewing tests, performing a medically appropriate examination and/or evaluation, counseling and educating the patient/family/caregiver, ordering medications, tests, or procedures, referring and communicating with other health care professionals, documenting information in the " medical record, and care coordination    Patient was seen and examined with Dr. Contreras,  resident, who performed portions of the examination and documentation for this patient's care under my direct supervision.  I agree with the above documentation and plan.    Donna Phoenix MD  02/19/24  14:34 EST

## 2024-02-20 ENCOUNTER — TELEPHONE (OUTPATIENT)
Dept: GYNECOLOGIC ONCOLOGY | Facility: CLINIC | Age: 74
End: 2024-02-20
Payer: MEDICARE

## 2024-02-20 NOTE — TELEPHONE ENCOUNTER
I called patient to discuss labs.  Will have her hold her Synthroid until she can get in with her primary care provider.  She is going to call them now to discuss her labs because she has them on MyChart.  She also has a follow-up visit there later this week.  Electronically signed by Donna Phoenix MD, 02/20/24, 4:12 PM EST.

## 2024-02-20 NOTE — TELEPHONE ENCOUNTER
Called and spoke with patient and informed patient of Rx and need for Lab, she stated that she had seen the medication Rx come through and that she already started taking it and so far it has offered some relief.  She will be here for appt on Friday.

## 2024-02-21 ENCOUNTER — HOSPITAL ENCOUNTER (OUTPATIENT)
Dept: GENERAL RADIOLOGY | Facility: HOSPITAL | Age: 74
Discharge: HOME | End: 2024-02-21
Payer: MEDICARE

## 2024-02-21 ENCOUNTER — HOSPITAL ENCOUNTER (OUTPATIENT)
Dept: CARDIOLOGY | Facility: HOSPITAL | Age: 74
Discharge: HOME | End: 2024-02-21
Payer: MEDICARE

## 2024-02-21 ENCOUNTER — TELEPHONE (OUTPATIENT)
Dept: GYNECOLOGIC ONCOLOGY | Facility: CLINIC | Age: 74
End: 2024-02-21
Payer: MEDICARE

## 2024-02-21 ENCOUNTER — PATIENT ROUNDING (BHMG ONLY) (OUTPATIENT)
Dept: GYNECOLOGIC ONCOLOGY | Facility: CLINIC | Age: 74
End: 2024-02-21
Payer: MEDICARE

## 2024-02-21 ENCOUNTER — LAB (OUTPATIENT)
Dept: LAB | Facility: HOSPITAL | Age: 74
End: 2024-02-21
Payer: MEDICARE

## 2024-02-21 DIAGNOSIS — C54.1 ENDOMETRIAL CANCER: ICD-10-CM

## 2024-02-21 DIAGNOSIS — R30.0 DYSURIA: ICD-10-CM

## 2024-02-21 LAB
BILIRUB UR QL STRIP: NEGATIVE
CLARITY UR: CLEAR
COLOR UR: YELLOW
GLUCOSE UR STRIP-MCNC: NEGATIVE MG/DL
HGB UR QL STRIP.AUTO: NEGATIVE
HOLD SPECIMEN: NORMAL
KETONES UR QL STRIP: ABNORMAL
LEUKOCYTE ESTERASE UR QL STRIP.AUTO: NEGATIVE
NITRITE UR QL STRIP: NEGATIVE
PH UR STRIP.AUTO: 6.5 [PH] (ref 5–8)
PROT UR QL STRIP: NEGATIVE
SP GR UR STRIP: 1.01 (ref 1–1.03)
UROBILINOGEN UR QL STRIP: ABNORMAL

## 2024-02-21 PROCEDURE — 93005 ELECTROCARDIOGRAM TRACING: CPT | Performed by: OBSTETRICS & GYNECOLOGY

## 2024-02-21 PROCEDURE — 71045 X-RAY EXAM CHEST 1 VIEW: CPT

## 2024-02-21 PROCEDURE — 81003 URINALYSIS AUTO W/O SCOPE: CPT

## 2024-02-21 NOTE — TELEPHONE ENCOUNTER
Pt had a D&C  about 2 to 3 weeks ago , found the Endometrial Ca.  Was referred to Dr Phoenix. Pt is schedule with Dr Phoenix to have surgery on 02/26/2024. Dr gonzalez prescribe medication for UTI. This morning pt is having  burning when urinating .

## 2024-02-21 NOTE — TELEPHONE ENCOUNTER
Patient called to discuss UTI symptoms. Patient is new to Dr. Phoenix after a D&C with Dr. Nix revealed cancer. Patient has been experiencing UTI symptoms since 2/16. Dr. Nix sent in a prescription for her and she filled it. Patient states that she was some better but not completely better. Patient has a Urinalysis ordered by Dr. Nix that the patient has not yet given. RN encouraged patient to give a UA to help identify if she is on the appropriate medication for her UTI.   Patient verbalizes that she will get UA done today.

## 2024-02-21 NOTE — PROGRESS NOTES
A My-Chart message has been sent to the patient for PATIENT ROUNDING with [facility].

## 2024-02-23 ENCOUNTER — TELEPHONE (OUTPATIENT)
Dept: GYNECOLOGIC ONCOLOGY | Facility: CLINIC | Age: 74
End: 2024-02-23
Payer: MEDICARE

## 2024-02-23 ENCOUNTER — OFFICE VISIT (OUTPATIENT)
Dept: OBSTETRICS AND GYNECOLOGY | Facility: CLINIC | Age: 74
End: 2024-02-23
Payer: MEDICARE

## 2024-02-23 VITALS
SYSTOLIC BLOOD PRESSURE: 132 MMHG | HEIGHT: 67 IN | BODY MASS INDEX: 34.21 KG/M2 | WEIGHT: 218 LBS | DIASTOLIC BLOOD PRESSURE: 80 MMHG

## 2024-02-23 DIAGNOSIS — Z98.890 STATUS POST HYSTEROSCOPY: Primary | ICD-10-CM

## 2024-02-23 DIAGNOSIS — C54.1 ENDOMETRIAL CANCER: ICD-10-CM

## 2024-02-23 LAB
QT INTERVAL: 374 MS
QTC INTERVAL: 426 MS

## 2024-02-23 NOTE — TELEPHONE ENCOUNTER
CONFIRM SURGERY ON 02/26/2024 PLEASE ARRIVE AT 8:00 AM TO MAIN REGISTRATION AT [facility].     NPO AFTER MIDNIGHT ON 02/25/2024

## 2024-02-23 NOTE — PROGRESS NOTES
"Subjective   Chief Complaint   Patient presents with    post op     2 week S/P hysteroscopy with Myosure     Ricarda Maki Alvarez is a 73 y.o. year old  presenting to be seen for her post-operative visit.  Currently she reports no problems with eating, bowel movements, voiding, and pain is well controlled. She is no longer bleeding and overall healed well.     The results have previously been discussed with Ricarda.    The following portions of the patient's history were reviewed and updated as appropriate:current medications, allergies, past medical history, and past surgical history       Objective   /80 (BP Location: Right arm, Patient Position: Sitting, Cuff Size: Adult)   Ht 170.2 cm (67\")   Wt 98.9 kg (218 lb)   LMP  (LMP Unknown)   BMI 34.14 kg/m²     General:  well developed; well nourished  no acute distress  obese - Body mass index is 34.14 kg/m².   Pelvis: Deferred      Final Diagnosis   ENDOMETRIUM, CURETTAGE:  Endometrioid adenocarcinoma, FIGO grade 2, arising in the background of an endometrial polyp.   See special stain section for immunophenotypic expression pattern.        Assessment   S/P hysteroscopy with Myosure  Endometrial cancer      Plan   May return to full activity with no restrictions.   Patient has already had consultation with Gyn Onc Dr. Phoenix, and is scheduled for hysterectomy and lymph node dissection this coming 24.   The importance of keeping all planned follow-up and taking all medications as prescribed was emphasized.  Follow up for annual exam in 1 year or sooner PRN     No orders of the defined types were placed in this encounter.         This note was electronically signed.    Sandy Nix MD .  2024    "

## 2024-02-25 ENCOUNTER — ANESTHESIA EVENT (OUTPATIENT)
Dept: PERIOP | Facility: HOSPITAL | Age: 74
End: 2024-02-25
Payer: MEDICARE

## 2024-02-25 RX ORDER — FAMOTIDINE 10 MG/ML
20 INJECTION, SOLUTION INTRAVENOUS ONCE
Status: CANCELLED | OUTPATIENT
Start: 2024-02-25 | End: 2024-02-25

## 2024-02-25 RX ORDER — SODIUM CHLORIDE 0.9 % (FLUSH) 0.9 %
10 SYRINGE (ML) INJECTION AS NEEDED
Status: CANCELLED | OUTPATIENT
Start: 2024-02-25

## 2024-02-25 RX ORDER — SODIUM CHLORIDE 9 MG/ML
40 INJECTION, SOLUTION INTRAVENOUS AS NEEDED
Status: CANCELLED | OUTPATIENT
Start: 2024-02-25

## 2024-02-26 ENCOUNTER — ANESTHESIA (OUTPATIENT)
Dept: PERIOP | Facility: HOSPITAL | Age: 74
End: 2024-02-26
Payer: MEDICARE

## 2024-02-26 ENCOUNTER — HOSPITAL ENCOUNTER (OUTPATIENT)
Facility: HOSPITAL | Age: 74
Discharge: HOME | End: 2024-02-26
Attending: OBSTETRICS & GYNECOLOGY | Admitting: OBSTETRICS & GYNECOLOGY
Payer: MEDICARE

## 2024-02-26 VITALS
HEART RATE: 90 BPM | OXYGEN SATURATION: 95 % | WEIGHT: 218 LBS | RESPIRATION RATE: 16 BRPM | SYSTOLIC BLOOD PRESSURE: 172 MMHG | DIASTOLIC BLOOD PRESSURE: 86 MMHG | TEMPERATURE: 97.7 F | BODY MASS INDEX: 34.21 KG/M2 | HEIGHT: 67 IN

## 2024-02-26 DIAGNOSIS — C54.1 ENDOMETRIAL CANCER: ICD-10-CM

## 2024-02-26 PROCEDURE — 25010000002 HEPARIN (PORCINE) PER 1000 UNITS: Performed by: OBSTETRICS & GYNECOLOGY

## 2024-02-26 PROCEDURE — 38900 IO MAP OF SENT LYMPH NODE: CPT | Performed by: OBSTETRICS & GYNECOLOGY

## 2024-02-26 PROCEDURE — 38570 LAPAROSCOPY LYMPH NODE BIOP: CPT | Performed by: PHYSICIAN ASSISTANT

## 2024-02-26 PROCEDURE — 63710000001 FAMOTIDINE 20 MG TABLET: Performed by: ANESTHESIOLOGY

## 2024-02-26 PROCEDURE — 25010000002 DEXAMETHASONE PER 1 MG

## 2024-02-26 PROCEDURE — 25010000002 INDOCYANINE GREEN 25 MG RECONSTITUTED SOLUTION: Performed by: OBSTETRICS & GYNECOLOGY

## 2024-02-26 PROCEDURE — 25010000002 FENTANYL CITRATE (PF) 100 MCG/2ML SOLUTION

## 2024-02-26 PROCEDURE — 38570 LAPAROSCOPY LYMPH NODE BIOP: CPT | Performed by: OBSTETRICS & GYNECOLOGY

## 2024-02-26 PROCEDURE — 25010000002 PROPOFOL 10 MG/ML EMULSION

## 2024-02-26 PROCEDURE — 63710000001 ACETAMINOPHEN EXTRA STRENGTH 500 MG TABLET: Performed by: OBSTETRICS & GYNECOLOGY

## 2024-02-26 PROCEDURE — G0378 HOSPITAL OBSERVATION PER HR: HCPCS

## 2024-02-26 PROCEDURE — 63710000001 CELECOXIB 200 MG CAPSULE: Performed by: OBSTETRICS & GYNECOLOGY

## 2024-02-26 PROCEDURE — 58571 TLH W/T/O 250 G OR LESS: CPT | Performed by: OBSTETRICS & GYNECOLOGY

## 2024-02-26 PROCEDURE — 25010000002 ONDANSETRON PER 1 MG

## 2024-02-26 PROCEDURE — A9270 NON-COVERED ITEM OR SERVICE: HCPCS | Performed by: ANESTHESIOLOGY

## 2024-02-26 PROCEDURE — 25810000003 LACTATED RINGERS PER 1000 ML: Performed by: ANESTHESIOLOGY

## 2024-02-26 PROCEDURE — 25810000003 SODIUM CHLORIDE PER 500 ML: Performed by: OBSTETRICS & GYNECOLOGY

## 2024-02-26 PROCEDURE — 25010000002 SUGAMMADEX 200 MG/2ML SOLUTION

## 2024-02-26 PROCEDURE — 25010000002 FENTANYL CITRATE (PF) 50 MCG/ML SOLUTION

## 2024-02-26 PROCEDURE — 88309 TISSUE EXAM BY PATHOLOGIST: CPT | Performed by: OBSTETRICS & GYNECOLOGY

## 2024-02-26 PROCEDURE — 38900 IO MAP OF SENT LYMPH NODE: CPT | Performed by: PHYSICIAN ASSISTANT

## 2024-02-26 PROCEDURE — 63710000001 PREGABALIN 150 MG CAPSULE: Performed by: OBSTETRICS & GYNECOLOGY

## 2024-02-26 PROCEDURE — 58571 TLH W/T/O 250 G OR LESS: CPT | Performed by: PHYSICIAN ASSISTANT

## 2024-02-26 PROCEDURE — A9270 NON-COVERED ITEM OR SERVICE: HCPCS | Performed by: OBSTETRICS & GYNECOLOGY

## 2024-02-26 PROCEDURE — 25010000002 CEFAZOLIN PER 500 MG: Performed by: OBSTETRICS & GYNECOLOGY

## 2024-02-26 PROCEDURE — 88342 IMHCHEM/IMCYTCHM 1ST ANTB: CPT | Performed by: OBSTETRICS & GYNECOLOGY

## 2024-02-26 PROCEDURE — 25010000002 HYDROMORPHONE 1 MG/ML SOLUTION

## 2024-02-26 PROCEDURE — 88307 TISSUE EXAM BY PATHOLOGIST: CPT | Performed by: OBSTETRICS & GYNECOLOGY

## 2024-02-26 RX ORDER — FENTANYL CITRATE 50 UG/ML
50 INJECTION, SOLUTION INTRAMUSCULAR; INTRAVENOUS
Status: DISCONTINUED | OUTPATIENT
Start: 2024-02-26 | End: 2024-02-26 | Stop reason: HOSPADM

## 2024-02-26 RX ORDER — HEPARIN SODIUM 5000 [USP'U]/ML
5000 INJECTION, SOLUTION INTRAVENOUS; SUBCUTANEOUS ONCE
Status: COMPLETED | OUTPATIENT
Start: 2024-02-26 | End: 2024-02-26

## 2024-02-26 RX ORDER — BUPIVACAINE HYDROCHLORIDE AND EPINEPHRINE 5; 5 MG/ML; UG/ML
INJECTION, SOLUTION PERINEURAL AS NEEDED
Status: DISCONTINUED | OUTPATIENT
Start: 2024-02-26 | End: 2024-02-26 | Stop reason: HOSPADM

## 2024-02-26 RX ORDER — SODIUM CHLORIDE 9 MG/ML
40 INJECTION, SOLUTION INTRAVENOUS AS NEEDED
Status: DISCONTINUED | OUTPATIENT
Start: 2024-02-26 | End: 2024-02-26 | Stop reason: HOSPADM

## 2024-02-26 RX ORDER — ONDANSETRON 4 MG/1
4 TABLET, ORALLY DISINTEGRATING ORAL EVERY 8 HOURS PRN
Qty: 20 TABLET | Refills: 0 | Status: SHIPPED | OUTPATIENT
Start: 2024-02-26

## 2024-02-26 RX ORDER — SODIUM CHLORIDE 0.9 % (FLUSH) 0.9 %
10 SYRINGE (ML) INJECTION AS NEEDED
Status: DISCONTINUED | OUTPATIENT
Start: 2024-02-26 | End: 2024-02-26 | Stop reason: HOSPADM

## 2024-02-26 RX ORDER — DEXAMETHASONE SODIUM PHOSPHATE 4 MG/ML
INJECTION, SOLUTION INTRA-ARTICULAR; INTRALESIONAL; INTRAMUSCULAR; INTRAVENOUS; SOFT TISSUE AS NEEDED
Status: DISCONTINUED | OUTPATIENT
Start: 2024-02-26 | End: 2024-02-26 | Stop reason: SURG

## 2024-02-26 RX ORDER — SODIUM CHLORIDE 0.9 % (FLUSH) 0.9 %
10 SYRINGE (ML) INJECTION EVERY 12 HOURS SCHEDULED
Status: DISCONTINUED | OUTPATIENT
Start: 2024-02-26 | End: 2024-02-26 | Stop reason: HOSPADM

## 2024-02-26 RX ORDER — SODIUM CHLORIDE 9 MG/ML
INJECTION, SOLUTION INTRAVENOUS AS NEEDED
Status: DISCONTINUED | OUTPATIENT
Start: 2024-02-26 | End: 2024-02-26 | Stop reason: HOSPADM

## 2024-02-26 RX ORDER — FAMOTIDINE 20 MG/1
20 TABLET, FILM COATED ORAL ONCE
Status: COMPLETED | OUTPATIENT
Start: 2024-02-26 | End: 2024-02-26

## 2024-02-26 RX ORDER — FAMOTIDINE 10 MG/ML
20 INJECTION, SOLUTION INTRAVENOUS ONCE
Status: DISCONTINUED | OUTPATIENT
Start: 2024-02-26 | End: 2024-02-26 | Stop reason: HOSPADM

## 2024-02-26 RX ORDER — MIDAZOLAM HYDROCHLORIDE 1 MG/ML
0.5 INJECTION INTRAMUSCULAR; INTRAVENOUS
Status: DISCONTINUED | OUTPATIENT
Start: 2024-02-26 | End: 2024-02-26 | Stop reason: HOSPADM

## 2024-02-26 RX ORDER — SODIUM CHLORIDE, SODIUM LACTATE, POTASSIUM CHLORIDE, CALCIUM CHLORIDE 600; 310; 30; 20 MG/100ML; MG/100ML; MG/100ML; MG/100ML
9 INJECTION, SOLUTION INTRAVENOUS CONTINUOUS
Status: DISCONTINUED | OUTPATIENT
Start: 2024-02-26 | End: 2024-02-26 | Stop reason: HOSPADM

## 2024-02-26 RX ORDER — PROPOFOL 10 MG/ML
VIAL (ML) INTRAVENOUS AS NEEDED
Status: DISCONTINUED | OUTPATIENT
Start: 2024-02-26 | End: 2024-02-26 | Stop reason: SURG

## 2024-02-26 RX ORDER — HYDROMORPHONE HYDROCHLORIDE 1 MG/ML
0.5 INJECTION, SOLUTION INTRAMUSCULAR; INTRAVENOUS; SUBCUTANEOUS
Status: DISCONTINUED | OUTPATIENT
Start: 2024-02-26 | End: 2024-02-26 | Stop reason: HOSPADM

## 2024-02-26 RX ORDER — LIDOCAINE HYDROCHLORIDE 10 MG/ML
INJECTION, SOLUTION EPIDURAL; INFILTRATION; INTRACAUDAL; PERINEURAL AS NEEDED
Status: DISCONTINUED | OUTPATIENT
Start: 2024-02-26 | End: 2024-02-26 | Stop reason: SURG

## 2024-02-26 RX ORDER — FENTANYL CITRATE 50 UG/ML
INJECTION, SOLUTION INTRAMUSCULAR; INTRAVENOUS
Status: COMPLETED
Start: 2024-02-26 | End: 2024-02-26

## 2024-02-26 RX ORDER — FENTANYL CITRATE 50 UG/ML
INJECTION, SOLUTION INTRAMUSCULAR; INTRAVENOUS AS NEEDED
Status: DISCONTINUED | OUTPATIENT
Start: 2024-02-26 | End: 2024-02-26 | Stop reason: SURG

## 2024-02-26 RX ORDER — INDOCYANINE GREEN AND WATER 25 MG
KIT INJECTION AS NEEDED
Status: DISCONTINUED | OUTPATIENT
Start: 2024-02-26 | End: 2024-02-26 | Stop reason: HOSPADM

## 2024-02-26 RX ORDER — DOCUSATE SODIUM 250 MG
250 CAPSULE ORAL 2 TIMES DAILY
Qty: 60 CAPSULE | Refills: 0 | Status: SHIPPED | OUTPATIENT
Start: 2024-02-26

## 2024-02-26 RX ORDER — PREGABALIN 150 MG/1
150 CAPSULE ORAL ONCE
Status: COMPLETED | OUTPATIENT
Start: 2024-02-26 | End: 2024-02-26

## 2024-02-26 RX ORDER — LIDOCAINE HYDROCHLORIDE 10 MG/ML
0.5 INJECTION, SOLUTION EPIDURAL; INFILTRATION; INTRACAUDAL; PERINEURAL ONCE AS NEEDED
Status: DISCONTINUED | OUTPATIENT
Start: 2024-02-26 | End: 2024-02-26 | Stop reason: HOSPADM

## 2024-02-26 RX ORDER — ONDANSETRON 2 MG/ML
INJECTION INTRAMUSCULAR; INTRAVENOUS AS NEEDED
Status: DISCONTINUED | OUTPATIENT
Start: 2024-02-26 | End: 2024-02-26 | Stop reason: SURG

## 2024-02-26 RX ORDER — ROCURONIUM BROMIDE 10 MG/ML
INJECTION, SOLUTION INTRAVENOUS AS NEEDED
Status: DISCONTINUED | OUTPATIENT
Start: 2024-02-26 | End: 2024-02-26 | Stop reason: SURG

## 2024-02-26 RX ORDER — LIDOCAINE HYDROCHLORIDE 10 MG/ML
0.5 INJECTION, SOLUTION EPIDURAL; INFILTRATION; INTRACAUDAL; PERINEURAL ONCE AS NEEDED
Status: COMPLETED | OUTPATIENT
Start: 2024-02-26 | End: 2024-02-26

## 2024-02-26 RX ORDER — FAMOTIDINE 20 MG/1
20 TABLET, FILM COATED ORAL ONCE
Status: DISCONTINUED | OUTPATIENT
Start: 2024-02-26 | End: 2024-02-26 | Stop reason: HOSPADM

## 2024-02-26 RX ORDER — CELECOXIB 200 MG/1
200 CAPSULE ORAL ONCE
Status: COMPLETED | OUTPATIENT
Start: 2024-02-26 | End: 2024-02-26

## 2024-02-26 RX ORDER — SUCCINYLCHOLINE/SOD CL,ISO/PF 200MG/10ML
SYRINGE (ML) INTRAVENOUS AS NEEDED
Status: DISCONTINUED | OUTPATIENT
Start: 2024-02-26 | End: 2024-02-26 | Stop reason: SURG

## 2024-02-26 RX ORDER — IBUPROFEN 600 MG/1
600 TABLET ORAL EVERY 6 HOURS PRN
Qty: 100 TABLET | Refills: 0 | Status: SHIPPED | OUTPATIENT
Start: 2024-02-26

## 2024-02-26 RX ORDER — SENNOSIDES 8.6 MG
650 CAPSULE ORAL EVERY 8 HOURS PRN
Qty: 100 TABLET | Refills: 0 | Status: SHIPPED | OUTPATIENT
Start: 2024-02-26

## 2024-02-26 RX ORDER — ACETAMINOPHEN 500 MG
1000 TABLET ORAL ONCE
Status: COMPLETED | OUTPATIENT
Start: 2024-02-26 | End: 2024-02-26

## 2024-02-26 RX ADMIN — Medication 160 MG: at 10:44

## 2024-02-26 RX ADMIN — ACETAMINOPHEN 1000 MG: 500 TABLET ORAL at 08:54

## 2024-02-26 RX ADMIN — HYDROMORPHONE HYDROCHLORIDE 0.5 MG: 1 INJECTION, SOLUTION INTRAMUSCULAR; INTRAVENOUS; SUBCUTANEOUS at 13:41

## 2024-02-26 RX ADMIN — FAMOTIDINE 20 MG: 20 TABLET, FILM COATED ORAL at 08:54

## 2024-02-26 RX ADMIN — FENTANYL CITRATE 50 MCG: 50 INJECTION, SOLUTION INTRAMUSCULAR; INTRAVENOUS at 11:14

## 2024-02-26 RX ADMIN — FENTANYL CITRATE 50 MCG: 50 INJECTION, SOLUTION INTRAMUSCULAR; INTRAVENOUS at 14:15

## 2024-02-26 RX ADMIN — ROCURONIUM BROMIDE 20 MG: 10 INJECTION INTRAVENOUS at 12:04

## 2024-02-26 RX ADMIN — SUGAMMADEX 200 MG: 100 INJECTION, SOLUTION INTRAVENOUS at 13:00

## 2024-02-26 RX ADMIN — PROPOFOL 200 MG: 10 INJECTION, EMULSION INTRAVENOUS at 10:44

## 2024-02-26 RX ADMIN — ONDANSETRON 4 MG: 2 INJECTION INTRAMUSCULAR; INTRAVENOUS at 13:00

## 2024-02-26 RX ADMIN — LIDOCAINE HYDROCHLORIDE 0.5 ML: 10 INJECTION, SOLUTION EPIDURAL; INFILTRATION; INTRACAUDAL; PERINEURAL at 08:54

## 2024-02-26 RX ADMIN — ROCURONIUM BROMIDE 5 MG: 10 INJECTION INTRAVENOUS at 10:44

## 2024-02-26 RX ADMIN — ROCURONIUM BROMIDE 45 MG: 10 INJECTION INTRAVENOUS at 10:54

## 2024-02-26 RX ADMIN — CELECOXIB 200 MG: 200 CAPSULE ORAL at 08:54

## 2024-02-26 RX ADMIN — LIDOCAINE HYDROCHLORIDE 50 MG: 10 INJECTION, SOLUTION EPIDURAL; INFILTRATION; INTRACAUDAL; PERINEURAL at 10:44

## 2024-02-26 RX ADMIN — SODIUM CHLORIDE 2000 MG: 900 INJECTION INTRAVENOUS at 10:49

## 2024-02-26 RX ADMIN — PREGABALIN 150 MG: 150 CAPSULE ORAL at 08:54

## 2024-02-26 RX ADMIN — FENTANYL CITRATE 50 MCG: 50 INJECTION, SOLUTION INTRAMUSCULAR; INTRAVENOUS at 10:44

## 2024-02-26 RX ADMIN — FENTANYL CITRATE 100 MCG: 50 INJECTION, SOLUTION INTRAMUSCULAR; INTRAVENOUS at 11:26

## 2024-02-26 RX ADMIN — SODIUM CHLORIDE, POTASSIUM CHLORIDE, SODIUM LACTATE AND CALCIUM CHLORIDE 9 ML/HR: 600; 310; 30; 20 INJECTION, SOLUTION INTRAVENOUS at 08:55

## 2024-02-26 RX ADMIN — DEXAMETHASONE SODIUM PHOSPHATE 4 MG: 4 INJECTION, SOLUTION INTRA-ARTICULAR; INTRALESIONAL; INTRAMUSCULAR; INTRAVENOUS; SOFT TISSUE at 10:49

## 2024-02-26 NOTE — ANESTHESIA POSTPROCEDURE EVALUATION
Patient: Ricarda Alvarez    Procedure Summary       Date: 02/26/24 [facility] / [facility]    Anesthesia Start: 1039 Anesthesia Stop: 1317    Procedure: INJECTION OF ICG DYE, TOTAL LAPAROSCOPIC HYSTERECTOMY, BILATERAL SALPINGOOPHORECTOMY, SENTINEL/COMPLETION NODE DISSECTION WITH DAVINCI ROBOT (Bilateral) Diagnosis:       Endometrial cancer      (Endometrial cancer [C54.1])    Surgeons: Donna Phoenix MD Provider: Jessenia Aguilar MD    Anesthesia Type: general ASA Status: 3            Anesthesia Type: general    Vitals  Vitals Value Taken Time   /72 02/26/24 1317   Temp 97.4 °F (36.3 °C) 02/26/24 1317   Pulse 74 02/26/24 1317   Resp 12 02/26/24 1317   SpO2 100 % 02/26/24 1317           Post Anesthesia Care and Evaluation    Patient location during evaluation: PACU  Patient participation: complete - patient participated  Level of consciousness: awake and alert  Pain score: 0  Pain management: adequate    Airway patency: patent  Anesthetic complications: No anesthetic complications  PONV Status: none  Cardiovascular status: hemodynamically stable and acceptable  Respiratory status: nonlabored ventilation, acceptable and nasal cannula  Hydration status: acceptable

## 2024-02-26 NOTE — ANESTHESIA PROCEDURE NOTES
Airway  Urgency: elective    Date/Time: 2/26/2024 10:46 AM  Airway not difficult    General Information and Staff    Patient location during procedure: OR  CRNA/CAA: Wai Welsh CRNA    Indications and Patient Condition  Indications for airway management: airway protection    Preoxygenated: yes  MILS not maintained throughout  Mask difficulty assessment: 0 - not attempted    Final Airway Details  Final airway type: endotracheal airway      Successful airway: ETT  Cuffed: yes   Successful intubation technique: direct laryngoscopy and RSI  Facilitating devices/methods: intubating stylet and cricoid pressure  Endotracheal tube insertion site: oral  Blade: Gabbi  Blade size: 3  ETT size (mm): 7.0  Cormack-Lehane Classification: grade I - full view of glottis  Placement verified by: chest auscultation and capnometry   Cuff volume (mL): 10  Measured from: lips  ETT/EBT  to lips (cm): 21  Number of attempts at approach: 1  Assessment: lips, teeth, and gum same as pre-op and atraumatic intubation    Additional Comments  Negative epigastric sounds, Breath sound equal bilaterally with symmetric chest rise and fall

## 2024-02-26 NOTE — OP NOTE
Subjective     Date of Service:  02/26/24  Time of Service:  13:30 EST    Surgical Staff: Surgeon(s) and Role:     * Donna Phoenix MD - Primary     * Heather Contreras MD - Resident - Assisting   Additional Staff:   Assistant: Domingo Lni PA-C  was responsible for performing the following activities: Retraction, Suction, Irrigation, Closing, and Placing Dressing and their skilled assistance was necessary for the success of this case.     Pre-operative diagnosis(es): Pre-Op Diagnosis Codes:     * Endometrial cancer [C54.1]     Post-operative diagnosis(es): Post-Op Diagnosis Codes:     * Endometrial cancer [C54.1]   Procedure(s): Procedure(s):  INJECTION OF ICG DYE, TOTAL LAPAROSCOPIC HYSTERECTOMY, BILATERAL SALPINGOOPHORECTOMY, LEFT PELVIC SENTINEL LYMPH NODE DISSECTION WITH DAVINCI ROBOT     Antibiotics: cefazolin (Ancef) ordered on call to OR     Anesthesia: Type: General  ASA:  III     Objective      Operative findings: At that time of placement of the vaginal instruments, uterus was anteverted and cervix was somewhat retropubic.  Umbilical hernia was noted.  At the time of laparoscopy, there is diverticular disease.  [place] lymph node on the left mapped to the external iliac lymph node group.  On the right there is no lymph node mapping.  Therefore, specimen was sent for frozen section.  On frozen section there is grade 1 endometrioid adenocarcinoma that was noninvasive.  Therefore, no lymph node dissection was performed on the right.   Specimens removed: ID Type Source Tests Collected by Time   A (Not marked as sent) : Left pelvic sentinel node Tissue [place] Lymph Node TISSUE PATHOLOGY EXAM Dnona Phoenix MD 2/26/2024 1129   B : UTERUS, CERVIX, BILATERAL TUBES AND OVARIES- FROZEN Tissue Uterus with Cervix, Bilateral Tubes and Ovaries TISSUE PATHOLOGY EXAM Donna Phoenix MD 2/26/2024 1152      Fluid Intake and Output: I/O this shift:  In: 900 [I.V.:800; IV Piggyback:100]  Out: 300 [Urine:250;  Blood:50]   Blood products used: No   Drains: * No LDAs found *   Implant Information: Nothing was implanted during the procedure   Complications: No immediate   Intraoperative consult(s):    Condition: stable   Disposition: to PACU and then discharge home       Indications:  Patient is a pleasant 73-year-old woman who is diagnosed with a grade 2 endometrioid adenocarcinoma.  Risk and benefits of surgery were discussed.  Consent was signed and on chart.    Procedure: After obtaining informed consent, the patient was taken to the operating room and underwent general endotracheal anesthesia after patient and site verification. The feet were placed in David stirrups. The arms were tucked at the sides. Steep Trendelenburg positioning was tested and found to be adequate. The abdomen, perineum, and vagina were prepped and draped in the usual sterile fashion. Asher catheter was anchored. Retractors were used to visualize the cervix.  Cervix was injected at 3 and 9:00 with ICG dye in the standard fashion.  Cervix was sounded and the appropriate uterine manipulator was called for.  Uterine manipulator was secured with out difficulty.  Attention was turned to the abdomen. Prior to each incision, skin was injected with 0.5% Marcaine with epinephrine.  Varies needle was inserted at the umbilicus and the abdomen was insufflated to pressure 15 mmHg with CO2 gas.  An 8 mm trocar was inserted at the umbilical midline position without difficulty.  Laparoscope was introduced to confirm positioning. Steep Trendelenburg was called for.  2 left-sided 8 mm and 2 right-sided 8 mm trochars were all placed under direct visualization.  The above findings were noted.  Da Wilman robot was docked to the patient and surgeon retired to the operating console.    The peritoneum overlying the pelvic sidewalls was divided with monopolar scissors. Infundibulopelvic ligaments were isolated from surrounding structures and pedicles were created using  bipolar cautery and scissors. Likewise, the round ligaments were divided. Anterior and posterior leaves of the broad ligament were divided with monopolar scissors. A bladder flap was developed.  Uterine vessels were isolated. Pedicles were created at the level of the uterine vessels using bipolar cautery and scissors. Cardinal ligament and uterosacral ligament complexes were divided in a similar fashion. Monopolar scissors were used to perform a circumferential colpotomy and the specimen was handed off intact via the vagina for frozen section with the above diagnosis.    Right pelvic sentinel lymph node dissection was performed according to the usual anatomic landmarks including the aorta and its bifurcation, the common iliac arteries and their bifurcations, the external and internal iliac arteries, the obturator and genitofemoral nerves, and the umbilical ligament. Pelvic lymph nodes were placed in Endo Catch bags and removed from the field.  Lymph node dissection was otherwise aborted due to frozen section as noted above.    The vaginal cuff was closed with 0 Vicryl suture in a running locking fashion x2 layers. Good hemostasis was noted. Additional hemostasis was achieved at the pelvis as needed using bipolar cautery. Da Wilman robot was undocked from the patient and the surgeon returned to the bedside.  Trochars were removed under direct visualization.  CO2 gas was allowed to escape from the abdominal cavity. At that point, no fascial defects could be palpated.  The skin was closed with 3-0 Monocryl in subcuticular stitch and glue was placed at the skin. The vagina was inspected.  Occluder and all instruments had been removed from the vagina.  3-0 Vicryl in a single simple stitch was used to reapproximate focal bleeding abrasion at the posterior fourchette.  At that point, excellent hemostasis was noted.  Bladder was back filled with 120 mL of sterile solution and the sage was discontinued.  The patient was  taken to the recovery room in good condition. There were no immediate complications. All counts were correct.      Donna Phoenix MD  02/26/24  13:30 EST

## 2024-02-26 NOTE — ANESTHESIA PREPROCEDURE EVALUATION
" Anesthesia Evaluation     Patient summary reviewed and Nursing notes reviewed   no history of anesthetic complications:   NPO Solid Status: > 8 hours  NPO Liquid Status: > 2 hours           Airway   Mallampati: II  TM distance: >3 FB  Neck ROM: full  No difficulty expected  Dental - normal exam     Pulmonary - normal exam   (+) a smoker,sleep apnea (no cpap)  Cardiovascular - normal exam    ECG reviewed    (+) hyperlipidemia  (-) dysrhythmias, angina      Neuro/Psych  (+) psychiatric history Anxiety and Depression  (-) seizures, TIA, CVA  GI/Hepatic/Renal/Endo    (+) hiatal hernia, GERD, thyroid problem hypothyroidism  (-) no renal disease, diabetes    ROS Comment: Often feel like a \"knot\" in her stomach    Musculoskeletal     Abdominal    Substance History      OB/GYN          Other      history of cancer (endometrial cancer)                Anesthesia Plan    ASA 3     general   Rapid sequence  intravenous induction     Anesthetic plan, risks, benefits, and alternatives have been provided, discussed and informed consent has been obtained with: patient.    Plan discussed with CRNA.    CODE STATUS:         "

## 2024-02-26 NOTE — INTERVAL H&P NOTE
"Pre-Op H&P (See Recent Office Note Attached for Full H&P)    History and physical note from office reviewed and updated with the following, otherwise there are no changes in H&P:      Review of Systems:  General ROS:  no fever, chills, rashes.  No change since last office visit.  No recent sick exposure  Cardiovascular ROS: no chest pain or dyspnea on exertion  Respiratory ROS: no cough, shortness of breath, or wheezing    Immunization History:   Immunization History   Administered Date(s) Administered    COVID-19 (PFIZER) Purple Cap Monovalent 02/15/2021, 03/10/2021, 10/21/2021   Influenza: no   Pneumococcal: yes   Tetanus: yes         Meds:    No current facility-administered medications on file prior to encounter.     Current Outpatient Medications on File Prior to Encounter   Medication Sig Dispense Refill    busPIRone (BUSPAR) 5 MG tablet Take 1 tablet by mouth 3 (Three) Times a Day.      dexlansoprazole (Dexilant) 60 MG capsule Dexilant 60 mg capsule, delayed release   TAKE ONE CAPSULE BY MOUTH DAILY      levothyroxine (SYNTHROID, LEVOTHROID) 137 MCG tablet       ASPIRIN 81 PO aspirin 81 mg tablet   Daily      clobetasol (TEMOVATE) 0.05 % ointment Apply 1 application  topically to the appropriate area as directed 2 (Two) Times a Day. 30 g 2    escitalopram (LEXAPRO) 20 MG tablet escitalopram 20 mg tablet   TAKE ONE TABLET BY MOUTH DAILY      ibuprofen (ADVIL,MOTRIN) 600 MG tablet Take 1 tablet by mouth Every 6 (Six) Hours As Needed for Mild Pain. 60 tablet 1    nystatin (MYCOSTATIN) 527472 UNIT/GM ointment Apply 1 application topically to the appropriate area as directed 2 (Two) Times a Day. 30 g 3    simvastatin (ZOCOR) 20 MG tablet simvastatin 20 mg tablet   TAKE ONE TABLET BY MOUTH EVERY NIGHT AT BEDTIME         Vital Signs:  BP (!) 140/103 (BP Location: Right arm, Patient Position: Lying)   Pulse 89   Temp 97.8 °F (36.6 °C) (Temporal)   Resp 18   Ht 170.2 cm (67\")   Wt 98.9 kg (218 lb)   LMP  (LMP " Unknown)   SpO2 98%   BMI 34.14 kg/m²     Physical Exam:    CV:  S1S2 regular rate and rhythm, no murmur               Resp:  Clear to auscultation; respirations regular, even and unlabored    Results Review:     Lab Results   Component Value Date    WBC 7.38 02/19/2024    HGB 14.4 02/19/2024    HCT 43.6 02/19/2024    MCV 90.6 02/19/2024     02/19/2024    NEUTROABS 5.13 02/19/2024    GLUCOSE 101 (H) 02/19/2024    BUN 9 02/19/2024    CREATININE 0.76 02/19/2024     02/19/2024    K 3.8 02/19/2024     02/19/2024    CO2 26.0 02/19/2024    CALCIUM 9.3 02/19/2024    ALBUMIN 4.1 02/19/2024    AST 16 02/19/2024    ALT 13 02/19/2024    BILITOT 0.5 02/19/2024   I reviewed the patient's new clinical results.    Cancer Staging (if applicable)  Cancer Patient: __ yes __no __unknown; If yes, clinical stage T:__ N:__M:__, stage group or __N/A    Assessment/Plan:  ENDOMETRIAL CANCER /  INJECTION OF ICG DYE, TOTAL LAPAROSCOPIC HYSTERECTOMY, BILATERAL SALPINGOOPHORECTOMY, SENTINEL/COMPLETION NODE DISSECTION WITH DAVINCI ROBOT       YENNY Buckner   2/26/2024   08:47 EST

## 2024-02-27 ENCOUNTER — TELEPHONE (OUTPATIENT)
Dept: GYNECOLOGIC ONCOLOGY | Facility: CLINIC | Age: 74
End: 2024-02-27
Payer: MEDICARE

## 2024-02-27 NOTE — TELEPHONE ENCOUNTER
RN received a call from patient who was concerned about symptoms after surgery. Patient stated that she is hurting in her urethra. Patient stated that she is only taking ibuprofen. Patient states that the urethra hurts all the time, not just when she urinates. Patient also states that she feels her urine output is low compared to her intake.   RN told patient to pay attention to her output. RN enforced that if she is not urinating that is a problem. RN instructed patient to go to ER if she finds that she is unable to urinate.   RN also told the patient to follow up with the clinic if her urine output remains low.   RN reviewed post-operative expectations, bleeding, etc. Patient verbalized understanding.

## 2024-02-28 ENCOUNTER — TELEPHONE (OUTPATIENT)
Dept: GYNECOLOGIC ONCOLOGY | Facility: CLINIC | Age: 74
End: 2024-02-28
Payer: MEDICARE

## 2024-02-28 NOTE — TELEPHONE ENCOUNTER
Patient called Rn with complaints of no BM since last Friday. Patient states that she is not uncomfortable but was concerned. Patient also states that she is taking colace BID and has added miralax. Patient asked if she should take Milk of Magnesia. RN advised that she try the Milk of Magnesia today at the lower dose and if no BM, take the high dose or use an enema tomorrow. Patient verbalized understanding. Patient to call office with any additional questions or if she remains unable to elicit a BM.

## 2024-02-29 ENCOUNTER — APPOINTMENT (OUTPATIENT)
Dept: CT IMAGING | Facility: HOSPITAL | Age: 74
End: 2024-02-29
Payer: MEDICARE

## 2024-02-29 ENCOUNTER — TELEPHONE (OUTPATIENT)
Dept: GYNECOLOGIC ONCOLOGY | Facility: CLINIC | Age: 74
End: 2024-02-29
Payer: MEDICARE

## 2024-02-29 ENCOUNTER — HOSPITAL ENCOUNTER (EMERGENCY)
Facility: HOSPITAL | Age: 74
Discharge: HOME | End: 2024-02-29
Attending: EMERGENCY MEDICINE
Payer: MEDICARE

## 2024-02-29 VITALS
RESPIRATION RATE: 16 BRPM | HEART RATE: 80 BPM | OXYGEN SATURATION: 96 % | WEIGHT: 219 LBS | TEMPERATURE: 98.3 F | SYSTOLIC BLOOD PRESSURE: 164 MMHG | BODY MASS INDEX: 34.37 KG/M2 | HEIGHT: 67 IN | DIASTOLIC BLOOD PRESSURE: 88 MMHG

## 2024-02-29 DIAGNOSIS — K59.00 CONSTIPATION, UNSPECIFIED CONSTIPATION TYPE: Primary | ICD-10-CM

## 2024-02-29 LAB
ALBUMIN SERPL-MCNC: 3.7 G/DL (ref 3.5–5.2)
ALBUMIN/GLOB SERPL: 1.1 G/DL
ALP SERPL-CCNC: 101 U/L (ref 39–117)
ALT SERPL W P-5'-P-CCNC: 11 U/L (ref 1–33)
ANION GAP SERPL CALCULATED.3IONS-SCNC: 11 MMOL/L (ref 5–15)
AST SERPL-CCNC: 14 U/L (ref 1–32)
BASOPHILS # BLD AUTO: 0.07 10*3/MM3 (ref 0–0.2)
BASOPHILS NFR BLD AUTO: 0.9 % (ref 0–1.5)
BILIRUB SERPL-MCNC: 0.5 MG/DL (ref 0–1.2)
BUN SERPL-MCNC: 7 MG/DL (ref 8–23)
BUN/CREAT SERPL: 9 (ref 7–25)
CALCIUM SPEC-SCNC: 8.9 MG/DL (ref 8.6–10.5)
CHLORIDE SERPL-SCNC: 102 MMOL/L (ref 98–107)
CO2 SERPL-SCNC: 25 MMOL/L (ref 22–29)
CREAT SERPL-MCNC: 0.78 MG/DL (ref 0.57–1)
CYTO UR: NORMAL
DEPRECATED RDW RBC AUTO: 44.2 FL (ref 37–54)
EGFRCR SERPLBLD CKD-EPI 2021: 80.3 ML/MIN/1.73
EOSINOPHIL # BLD AUTO: 0.12 10*3/MM3 (ref 0–0.4)
EOSINOPHIL NFR BLD AUTO: 1.5 % (ref 0.3–6.2)
ERYTHROCYTE [DISTWIDTH] IN BLOOD BY AUTOMATED COUNT: 13.1 % (ref 12.3–15.4)
GLOBULIN UR ELPH-MCNC: 3.3 GM/DL
GLUCOSE SERPL-MCNC: 107 MG/DL (ref 65–99)
HCT VFR BLD AUTO: 46.3 % (ref 34–46.6)
HGB BLD-MCNC: 14.7 G/DL (ref 12–15.9)
IMM GRANULOCYTES # BLD AUTO: 0.03 10*3/MM3 (ref 0–0.05)
IMM GRANULOCYTES NFR BLD AUTO: 0.4 % (ref 0–0.5)
LAB AP CASE REPORT: NORMAL
LAB AP CLINICAL INFORMATION: NORMAL
LYMPHOCYTES # BLD AUTO: 2.37 10*3/MM3 (ref 0.7–3.1)
LYMPHOCYTES NFR BLD AUTO: 30 % (ref 19.6–45.3)
Lab: NORMAL
MCH RBC QN AUTO: 29.3 PG (ref 26.6–33)
MCHC RBC AUTO-ENTMCNC: 31.7 G/DL (ref 31.5–35.7)
MCV RBC AUTO: 92.4 FL (ref 79–97)
MONOCYTES # BLD AUTO: 0.41 10*3/MM3 (ref 0.1–0.9)
MONOCYTES NFR BLD AUTO: 5.2 % (ref 5–12)
NEUTROPHILS NFR BLD AUTO: 4.9 10*3/MM3 (ref 1.7–7)
NEUTROPHILS NFR BLD AUTO: 62 % (ref 42.7–76)
NRBC BLD AUTO-RTO: 0 /100 WBC (ref 0–0.2)
PATH REPORT.FINAL DX SPEC: NORMAL
PATH REPORT.GROSS SPEC: NORMAL
PLATELET # BLD AUTO: 287 10*3/MM3 (ref 140–450)
PMV BLD AUTO: 10 FL (ref 6–12)
POTASSIUM SERPL-SCNC: 4.3 MMOL/L (ref 3.5–5.2)
PROT SERPL-MCNC: 7 G/DL (ref 6–8.5)
RBC # BLD AUTO: 5.01 10*6/MM3 (ref 3.77–5.28)
SODIUM SERPL-SCNC: 138 MMOL/L (ref 136–145)
WBC NRBC COR # BLD AUTO: 7.9 10*3/MM3 (ref 3.4–10.8)

## 2024-02-29 PROCEDURE — 99284 EMERGENCY DEPT VISIT MOD MDM: CPT

## 2024-02-29 PROCEDURE — 36415 COLL VENOUS BLD VENIPUNCTURE: CPT

## 2024-02-29 PROCEDURE — 80053 COMPREHEN METABOLIC PANEL: CPT | Performed by: EMERGENCY MEDICINE

## 2024-02-29 PROCEDURE — 85025 COMPLETE CBC W/AUTO DIFF WBC: CPT | Performed by: EMERGENCY MEDICINE

## 2024-02-29 PROCEDURE — 74176 CT ABD & PELVIS W/O CONTRAST: CPT

## 2024-02-29 RX ORDER — SODIUM CHLORIDE 0.9 % (FLUSH) 0.9 %
10 SYRINGE (ML) INJECTION AS NEEDED
Status: DISCONTINUED | OUTPATIENT
Start: 2024-02-29 | End: 2024-02-29

## 2024-02-29 NOTE — Clinical Note
[facility] EMERGENCY DEPARTMENT  [address]  [facility] [address]  Phone: [phone]    Ricarda Alvarez was seen and treated in our emergency department on 2/29/2024.  She may return to work on 03/04/2024.         Thank you for choosing [facility].    Marlene Pierre MD

## 2024-03-01 ENCOUNTER — TELEPHONE (OUTPATIENT)
Dept: GYNECOLOGIC ONCOLOGY | Facility: CLINIC | Age: 74
End: 2024-03-01
Payer: MEDICARE

## 2024-03-01 NOTE — DISCHARGE INSTRUCTIONS
Make sure to drink plenty of fluid.    Take GoLytely as prescribed.    Engage in regular activity to help stimulate movement of the bowels.

## 2024-03-01 NOTE — TELEPHONE ENCOUNTER
RN called to check on patient as yesterday she had reported not having a BM since last Friday. Patient stated that she went to the Er last night and after she returned home she had a BM. Patient stated she feels fine today.  RN also gave patient the information of noninvasive endometrial cancer without metastasis per MD request. Patient was very appreciative and verbalized understanding.   ----- Message from Donna Phoenix MD sent at 2/29/2024  4:16 PM EST -----  Please notify patient of noninvasive endometrial cancer without evidence of metastasis.  She will not require additional treatment and has an excellent prognosis.  Thank you!    ----- Message -----  From: Lab, Background User  Sent: 2/26/2024   1:09 PM EST  To: Donna Phoenix MD

## 2024-03-05 NOTE — ED PROVIDER NOTES
Subjective   History of Present Illness  73-year-old female who presents for evaluation of constipation.  The patient reports that she had a hysterectomy performed roughly 10 days ago.  She states she has not had a bowel movement since.  She denies abdominal pain.  No nausea or vomiting.  No fever, bodies, or chills.  No chest pain, cough, shortness of breath.  No urine symptoms include no dysuria, frequency, or urgency.  No medication.  No other acute complaints.  Abdomen is soft and nontender diffusely, normal bowel sounds, no apparent      Review of Systems   Constitutional:  Negative for chills, fatigue and fever.   HENT:  Negative for congestion, ear pain, postnasal drip, sinus pressure and sore throat.    Eyes:  Negative for pain, redness and visual disturbance.   Respiratory:  Negative for cough, chest tightness and shortness of breath.    Cardiovascular:  Negative for chest pain, palpitations and leg swelling.   Gastrointestinal:  Positive for constipation. Negative for abdominal pain, anal bleeding, blood in stool, diarrhea, nausea and vomiting.   Endocrine: Negative for polydipsia and polyuria.   Genitourinary:  Negative for difficulty urinating, dysuria, frequency and urgency.   Musculoskeletal:  Negative for arthralgias, back pain and neck pain.   Skin:  Negative for pallor and rash.   Allergic/Immunologic: Negative for environmental allergies and immunocompromised state.   Neurological:  Negative for dizziness, weakness and headaches.   Hematological:  Negative for adenopathy.   Psychiatric/Behavioral:  Negative for confusion, self-injury and suicidal ideas. The patient is not nervous/anxious.    All other systems reviewed and are negative.      Past Medical History:   Diagnosis Date    Abnormal Pap smear of cervix     Anxiety     Constipation     Depression     Family history of breast cancer in sister     GERD (gastroesophageal reflux disease)     Hiatal hernia     HPV (human papilloma virus)  infection     Hyperlipidemia     Hypothyroidism     Joint pain     hio    Menopause     Palpitations        Allergies   Allergen Reactions    Penicillins Hives    Bupropion Other (See Comments)       Past Surgical History:   Procedure Laterality Date    APPENDECTOMY       SECTION      TOTAL LAPAROSCOPIC HYSTERECTOMY SALPINGO OOPHORECTOMY Bilateral 2024    Procedure: INJECTION OF ICG DYE, TOTAL LAPAROSCOPIC HYSTERECTOMY, BILATERAL SALPINGOOPHORECTOMY, LEFT PELVIC SENTINEL LYMPH NODE DISSECTION WITH DAVINCI ROBOT;  Surgeon: Donna Phoenix MD;  Location: [facility];  Service: Robotics - DaVinci;  Laterality: Bilateral;       Family History   Problem Relation Age of Onset    COPD Father     Coronary artery disease Mother     Rheum arthritis Brother     Diabetes Brother     Breast cancer Sister     Rheum arthritis Son        Social History     Socioeconomic History    Marital status:    Tobacco Use    Smoking status: Some Days     Types: Cigarettes     Passive exposure: Current    Smokeless tobacco: Never    Tobacco comments:     1-2 cigarettes/day   Vaping Use    Vaping status: Never Used   Substance and Sexual Activity    Alcohol use: Not Currently    Drug use: Never    Sexual activity: Not Currently     Birth control/protection: Post-menopausal           Objective   Physical Exam  Vitals and nursing note reviewed.   Constitutional:       General: She is not in acute distress.     Appearance: Normal appearance. She is well-developed. She is not toxic-appearing or diaphoretic.   HENT:      Head: Normocephalic and atraumatic.      Right Ear: External ear normal.      Left Ear: External ear normal.      Nose: Nose normal.   Eyes:      General: Lids are normal.      Pupils: Pupils are equal, round, and reactive to light.   Neck:      Trachea: No tracheal deviation.   Cardiovascular:      Rate and Rhythm: Normal rate and regular rhythm.      Pulses: No decreased pulses.      Heart sounds: Normal heart  sounds. No murmur heard.     No friction rub. No gallop.   Pulmonary:      Effort: Pulmonary effort is normal. No respiratory distress.      Breath sounds: Normal breath sounds. No decreased breath sounds, wheezing, rhonchi or rales.   Abdominal:      General: Bowel sounds are normal.      Palpations: Abdomen is soft.      Tenderness: There is no abdominal tenderness. There is no guarding or rebound.      Comments: Abdomen soft nontender use, bowel sounds, no peritoneal signs   Musculoskeletal:         General: No deformity. Normal range of motion.      Cervical back: Normal range of motion and neck supple.   Lymphadenopathy:      Cervical: No cervical adenopathy.   Skin:     General: Skin is warm and dry.      Findings: No rash.   Neurological:      Mental Status: She is alert and oriented to person, place, and time.      Cranial Nerves: No cranial nerve deficit.      Sensory: No sensory deficit.   Psychiatric:         Speech: Speech normal.         Behavior: Behavior normal.         Thought Content: Thought content normal.         Judgment: Judgment normal.         Procedures           ED Course                                             Medical Decision Making  Differential diagnosis includes constipation, bowel obstruction, diverticulitis, other unspecified etiology.      Labs are noncontributory.  Normal white count, normal H&H, normal kidney function, no significant electrolyte abnormalities.    CT scan abdomen pelvis shows mild to moderate fecal load.  Free air consistent with recent surgical intervention.  No other acute abnormalities.    The patient abdomen soft and nontender.  She will be advised to drink with fluid, do a high-fiber diet, and will be given a prescription for GoLytely.    Advised to follow-up primary care physician for recheck in 1 week    Problems Addressed:  Constipation, unspecified constipation type: complicated acute illness or injury with systemic symptoms    Amount and/or Complexity  of Data Reviewed  Independent Historian: spouse     Details:  provides additional information.  External Data Reviewed: labs and radiology.  Labs: ordered. Decision-making details documented in ED Course.  Radiology: ordered and independent interpretation performed. Decision-making details documented in ED Course.    Risk  Prescription drug management.        Final diagnoses:   Constipation, unspecified constipation type       ED Disposition  ED Disposition       ED Disposition   Discharge    Condition   Stable    Comment   --               Jayde Elkins MD  [address] [id number] [phone]    In 1 week           Medication List        ASK your doctor about these medications      polyethylene glycol 236 g solution  Commonly known as: GoLYTELY  Take 4,000 mL by mouth 1 (One) Time for 1 dose.  Ask about: Should I take this medication?               Where to Get Your Medications        These medications were sent to [facility] [id number] - [place], [place] - [address] [place] [address] [address] - [phone]  - [phone] [address] [id number]      Phone: [phone]   polyethylene glycol 236 g solution            Marlene Pierre MD  03/05/24 0037

## 2024-03-19 NOTE — PROGRESS NOTES
"Ricarda Alvarez  [id number]  1950      Reason for Visit:  Treatment planning for newly diagnosed stage 1A grade 2 EAC, Postoperative evaluation    History of Present Illness:  Patient is a very pleasant 73 y.o. woman who presents for a post operative evaluation status post INJECTION OF ICG DYE, TOTAL LAPAROSCOPIC HYSTERECTOMY, BILATERAL SALPINGOOPHORECTOMY, LEFT PELVIC SENTINEL LYMPH NODE DISSECTION WITH DAVINCI ROBOT  performed on 2/26/24.      Surgery and hospital course were uncomplicated. Patient did struggle with constipation post op. She went to ED where imaging was normal, she was able to have BM following treatment with miralax. Today, patient notes normal bowel and bladder function.  Her pain is well controlled. She has questions about resuming normal activities.     Prior to surgery she was having early satiety and intermittent constipation/diarrhea, she continues to have these symptoms but has an appointment with GI in April.     Past Medical History, Past Surgical History, Social History, Family History have been reviewed and are without significant changes except as mentioned.    Review of Systems   All other systems were reviewed and are negative except as mentioned above.    Medications:  The current medication list was reviewed in the EMR    ALLERGIES:    Allergies   Allergen Reactions    Penicillins Hives    Bupropion Other (See Comments)         /66   Pulse 75   Temp 97.7 °F (36.5 °C) (Temporal)   Resp 17   Ht 170.2 cm (67.01\")   Wt 95.7 kg (210 lb 14.4 oz)   LMP  (LMP Unknown)   SpO2 98%   BMI 33.02 kg/m²   ECOG score: 0       Physical Exam  Constitutional:  Patient is a pleasant woman in no acute distress.  Gastrointestinal: Abdomen is soft and appropriately tender.  There is no mass palpated.  There is no rebound or guarding.  Incision(s) is clean, dry and intact. Umbilical hernia noted.   Extremities:  Bilateral lower extremities are non-tender.  Gynecologic:External " genitalia are free from lesion. On speculum examination, the vaginal cuff was intact and no lesions were appreciated.  On bimanual examination, no fullness was appreciated.  Uterus, cervix and adnexa were absent.  There was no significant tenderness.  Rectovaginal exam was deferred.      PATHOLOGY:  Final Diagnosis   1.  LYMPH NODE, LEFT PELVIC, SENTINEL NODE EXCISION:  1 lymph node negative for metastatic carcinoma supported by immunohistochemical stain for cytokeratin KANNAN with appropriate control (0/1).     2.  UTERUS, CERVIX, BILATERAL OVARIES AND FALLOPIAN TUBES, HYSTERECTOMY AND BILATERAL SALPINGO-OOPHORECTOMY:  Endometrioid adenocarcinoma, FIGO grade 2, arising in the setting of atypical hyperplasia.  No definitive myometrial invasion identified.  No involvement of cervical stroma, parametria or bilateral adnexa.  Incidental benign squamous lined cyst in the left ovary compatible with small mono dermal mature teratoma.  See tumor synoptic for additional details.       ASSESSMENT/PLAN:  Ricarda Alvarez returns for a post-operative evaluation today.  All pathology reports were discussed with the patient. Final surgical stage IA. No adjuvant treatment required. Recommended observation. Patient will return to clinic in 6 months for survivorship appointment, then likely return to regular care.      Overall, the patient is very pleased with her care.  I recommended continuation of post operative precautions as discussed.     She is to follow-up in 6 months    Anya Varela MD   PGY3- OBGYN Resident   [facility]    Patient was seen and examined with Dr. Dorado,  resident, who performed portions of the examination and documentation for this patient's care under my direct supervision.  I agree with the above documentation and plan.    Donna Phoenix MD  03/20/24  15:47 EDT

## 2024-03-20 ENCOUNTER — OFFICE VISIT (OUTPATIENT)
Dept: GYNECOLOGIC ONCOLOGY | Facility: CLINIC | Age: 74
End: 2024-03-20
Payer: MEDICARE

## 2024-03-20 VITALS
HEART RATE: 75 BPM | HEIGHT: 67 IN | SYSTOLIC BLOOD PRESSURE: 129 MMHG | OXYGEN SATURATION: 98 % | BODY MASS INDEX: 33.1 KG/M2 | DIASTOLIC BLOOD PRESSURE: 66 MMHG | RESPIRATION RATE: 17 BRPM | TEMPERATURE: 97.7 F | WEIGHT: 210.9 LBS

## 2024-03-20 DIAGNOSIS — Z98.890 POST-OPERATIVE STATE: Primary | ICD-10-CM

## 2024-03-20 PROCEDURE — 1160F RVW MEDS BY RX/DR IN RCRD: CPT | Performed by: OBSTETRICS & GYNECOLOGY

## 2024-03-20 PROCEDURE — 1159F MED LIST DOCD IN RCRD: CPT | Performed by: OBSTETRICS & GYNECOLOGY

## 2024-03-20 PROCEDURE — 99024 POSTOP FOLLOW-UP VISIT: CPT | Performed by: OBSTETRICS & GYNECOLOGY

## 2024-03-20 PROCEDURE — 1126F AMNT PAIN NOTED NONE PRSNT: CPT | Performed by: OBSTETRICS & GYNECOLOGY

## 2024-03-20 RX ORDER — FLUOXETINE HYDROCHLORIDE 20 MG/1
CAPSULE ORAL
COMMUNITY
Start: 2024-03-11

## 2024-03-20 RX ORDER — BUSPIRONE HYDROCHLORIDE 10 MG/1
TABLET ORAL
COMMUNITY
Start: 2024-03-11

## 2024-03-20 RX ORDER — LEVOTHYROXINE SODIUM 0.12 MG/1
1 TABLET ORAL DAILY
COMMUNITY
Start: 2024-02-25

## 2024-03-29 LAB
CYTO UR: NORMAL
LAB AP CASE REPORT: NORMAL
LAB AP CLINICAL INFORMATION: NORMAL
LAB AP INTEGRATED ONCOLOGY, ADDENDUM: NORMAL
LAB AP SPECIAL STAINS: NORMAL
PATH REPORT.ADDENDUM SPEC: NORMAL
PATH REPORT.FINAL DX SPEC: NORMAL
PATH REPORT.GROSS SPEC: NORMAL

## 2024-04-01 ENCOUNTER — TELEPHONE (OUTPATIENT)
Dept: GYNECOLOGIC ONCOLOGY | Facility: CLINIC | Age: 74
End: 2024-04-01
Payer: MEDICARE

## 2024-04-01 NOTE — TELEPHONE ENCOUNTER
"Patient called and reported light spotting this morning. Patient had surgery on 2/26 with Dr. Phoenix. Patient also reported \"I may have been overdoing it.\" Patient stated that her  was in the hospital and she stayed with him and was caring for him. RN reassured patient that spotting for up to 6 weeks is normal but reviewed the s/s of hemorrhage. Patient verbalized understanding. RN reviewed with patient the potential causes of spotting and told patient to call if the spotting continues after 6 weeks post operation. Patient was appreciative and verbalized understanding.   "

## 2024-05-08 VITALS — HEIGHT: 67 IN | BODY MASS INDEX: 30.92 KG/M2 | WEIGHT: 197 LBS

## 2024-05-08 RX ORDER — UREA 10 %
500 LOTION (ML) TOPICAL DAILY
COMMUNITY

## 2024-05-08 RX ORDER — POLYETHYLENE GLYCOL 3350 17 G/17G
17 POWDER, FOR SOLUTION ORAL DAILY PRN
COMMUNITY

## 2024-05-08 RX ORDER — DULOXETIN HYDROCHLORIDE 30 MG/1
30 CAPSULE, DELAYED RELEASE ORAL DAILY
COMMUNITY

## 2024-05-14 ENCOUNTER — HOSPITAL ENCOUNTER (OUTPATIENT)
Facility: HOSPITAL | Age: 74
Setting detail: HOSPITAL OUTPATIENT SURGERY
Discharge: HOME | End: 2024-05-14
Attending: SURGERY | Admitting: SURGERY
Payer: MEDICARE

## 2024-05-14 PROCEDURE — 91010 ESOPHAGUS MOTILITY STUDY: CPT | Performed by: SURGERY

## 2024-05-14 RX ORDER — LIDOCAINE HYDROCHLORIDE 20 MG/ML
SOLUTION OROPHARYNGEAL AS NEEDED
Status: DISCONTINUED | OUTPATIENT
Start: 2024-05-14 | End: 2024-05-14 | Stop reason: HOSPADM

## 2024-09-04 ENCOUNTER — APPOINTMENT (OUTPATIENT)
Dept: GENERAL RADIOLOGY | Facility: HOSPITAL | Age: 74
End: 2024-09-04
Payer: MEDICARE

## 2024-09-04 ENCOUNTER — HOSPITAL ENCOUNTER (EMERGENCY)
Facility: HOSPITAL | Age: 74
Discharge: HOME | End: 2024-09-05
Attending: EMERGENCY MEDICINE
Payer: MEDICARE

## 2024-09-04 VITALS
DIASTOLIC BLOOD PRESSURE: 97 MMHG | OXYGEN SATURATION: 99 % | HEIGHT: 67 IN | BODY MASS INDEX: 27.78 KG/M2 | HEART RATE: 75 BPM | RESPIRATION RATE: 18 BRPM | SYSTOLIC BLOOD PRESSURE: 164 MMHG | TEMPERATURE: 97.8 F | WEIGHT: 177 LBS

## 2024-09-04 DIAGNOSIS — Z86.39 HISTORY OF HYPERLIPIDEMIA: ICD-10-CM

## 2024-09-04 DIAGNOSIS — T50.905A ADVERSE EFFECT OF DRUG, INITIAL ENCOUNTER: ICD-10-CM

## 2024-09-04 DIAGNOSIS — R79.89 LOW TSH LEVEL: ICD-10-CM

## 2024-09-04 DIAGNOSIS — R00.2 PALPITATIONS: Primary | ICD-10-CM

## 2024-09-04 DIAGNOSIS — Z86.39 HISTORY OF HYPOTHYROIDISM: ICD-10-CM

## 2024-09-04 DIAGNOSIS — F32.A ANXIETY AND DEPRESSION: ICD-10-CM

## 2024-09-04 DIAGNOSIS — F41.9 ANXIETY AND DEPRESSION: ICD-10-CM

## 2024-09-04 DIAGNOSIS — I49.3 PVC'S (PREMATURE VENTRICULAR CONTRACTIONS): ICD-10-CM

## 2024-09-04 DIAGNOSIS — Z78.9 CAFFEINE USE: ICD-10-CM

## 2024-09-04 LAB
ALBUMIN SERPL-MCNC: 3.8 G/DL (ref 3.5–5.2)
ALBUMIN/GLOB SERPL: 1.4 G/DL
ALP SERPL-CCNC: 94 U/L (ref 39–117)
ALT SERPL W P-5'-P-CCNC: 5 U/L (ref 1–33)
ANION GAP SERPL CALCULATED.3IONS-SCNC: 11 MMOL/L (ref 5–15)
AST SERPL-CCNC: 14 U/L (ref 1–32)
BASOPHILS # BLD AUTO: 0.06 10*3/MM3 (ref 0–0.2)
BASOPHILS NFR BLD AUTO: 0.9 % (ref 0–1.5)
BILIRUB SERPL-MCNC: 0.4 MG/DL (ref 0–1.2)
BILIRUB UR QL STRIP: NEGATIVE
BUN SERPL-MCNC: 13 MG/DL (ref 8–23)
BUN/CREAT SERPL: 14.1 (ref 7–25)
CALCIUM SPEC-SCNC: 9.1 MG/DL (ref 8.6–10.5)
CHLORIDE SERPL-SCNC: 105 MMOL/L (ref 98–107)
CLARITY UR: CLEAR
CO2 SERPL-SCNC: 25 MMOL/L (ref 22–29)
COLOR UR: YELLOW
CREAT SERPL-MCNC: 0.92 MG/DL (ref 0.57–1)
DEPRECATED RDW RBC AUTO: 45.9 FL (ref 37–54)
EGFRCR SERPLBLD CKD-EPI 2021: 65.9 ML/MIN/1.73
EOSINOPHIL # BLD AUTO: 0.11 10*3/MM3 (ref 0–0.4)
EOSINOPHIL NFR BLD AUTO: 1.7 % (ref 0.3–6.2)
ERYTHROCYTE [DISTWIDTH] IN BLOOD BY AUTOMATED COUNT: 13.4 % (ref 12.3–15.4)
GLOBULIN UR ELPH-MCNC: 2.8 GM/DL
GLUCOSE SERPL-MCNC: 106 MG/DL (ref 65–99)
GLUCOSE UR STRIP-MCNC: NEGATIVE MG/DL
HCT VFR BLD AUTO: 43 % (ref 34–46.6)
HGB BLD-MCNC: 13.9 G/DL (ref 12–15.9)
HGB UR QL STRIP.AUTO: NEGATIVE
HOLD SPECIMEN: NORMAL
IMM GRANULOCYTES # BLD AUTO: 0.01 10*3/MM3 (ref 0–0.05)
IMM GRANULOCYTES NFR BLD AUTO: 0.2 % (ref 0–0.5)
KETONES UR QL STRIP: NEGATIVE
LEUKOCYTE ESTERASE UR QL STRIP.AUTO: NEGATIVE
LYMPHOCYTES # BLD AUTO: 2.3 10*3/MM3 (ref 0.7–3.1)
LYMPHOCYTES NFR BLD AUTO: 34.7 % (ref 19.6–45.3)
MAGNESIUM SERPL-MCNC: 2.1 MG/DL (ref 1.6–2.4)
MCH RBC QN AUTO: 29.7 PG (ref 26.6–33)
MCHC RBC AUTO-ENTMCNC: 32.3 G/DL (ref 31.5–35.7)
MCV RBC AUTO: 91.9 FL (ref 79–97)
MONOCYTES # BLD AUTO: 0.32 10*3/MM3 (ref 0.1–0.9)
MONOCYTES NFR BLD AUTO: 4.8 % (ref 5–12)
NEUTROPHILS NFR BLD AUTO: 3.83 10*3/MM3 (ref 1.7–7)
NEUTROPHILS NFR BLD AUTO: 57.7 % (ref 42.7–76)
NITRITE UR QL STRIP: NEGATIVE
NRBC BLD AUTO-RTO: 0 /100 WBC (ref 0–0.2)
NT-PROBNP SERPL-MCNC: 252 PG/ML (ref 0–900)
PH UR STRIP.AUTO: 5.5 [PH] (ref 5–8)
PLATELET # BLD AUTO: 251 10*3/MM3 (ref 140–450)
PMV BLD AUTO: 9.9 FL (ref 6–12)
POTASSIUM SERPL-SCNC: 4.1 MMOL/L (ref 3.5–5.2)
PROT SERPL-MCNC: 6.6 G/DL (ref 6–8.5)
PROT UR QL STRIP: NEGATIVE
RBC # BLD AUTO: 4.68 10*6/MM3 (ref 3.77–5.28)
SODIUM SERPL-SCNC: 141 MMOL/L (ref 136–145)
SP GR UR STRIP: 1.02 (ref 1–1.03)
TROPONIN T SERPL HS-MCNC: 7 NG/L
TSH SERPL DL<=0.05 MIU/L-ACNC: 0.1 UIU/ML (ref 0.27–4.2)
UROBILINOGEN UR QL STRIP: NORMAL
WBC NRBC COR # BLD AUTO: 6.63 10*3/MM3 (ref 3.4–10.8)
WHOLE BLOOD HOLD COAG: NORMAL
WHOLE BLOOD HOLD SPECIMEN: NORMAL

## 2024-09-04 PROCEDURE — 84484 ASSAY OF TROPONIN QUANT: CPT | Performed by: EMERGENCY MEDICINE

## 2024-09-04 PROCEDURE — 85025 COMPLETE CBC W/AUTO DIFF WBC: CPT | Performed by: EMERGENCY MEDICINE

## 2024-09-04 PROCEDURE — 93005 ELECTROCARDIOGRAM TRACING: CPT

## 2024-09-04 PROCEDURE — 93005 ELECTROCARDIOGRAM TRACING: CPT | Performed by: EMERGENCY MEDICINE

## 2024-09-04 PROCEDURE — 36415 COLL VENOUS BLD VENIPUNCTURE: CPT

## 2024-09-04 PROCEDURE — 83735 ASSAY OF MAGNESIUM: CPT | Performed by: EMERGENCY MEDICINE

## 2024-09-04 PROCEDURE — 84443 ASSAY THYROID STIM HORMONE: CPT | Performed by: EMERGENCY MEDICINE

## 2024-09-04 PROCEDURE — 83880 ASSAY OF NATRIURETIC PEPTIDE: CPT | Performed by: EMERGENCY MEDICINE

## 2024-09-04 PROCEDURE — 99284 EMERGENCY DEPT VISIT MOD MDM: CPT

## 2024-09-04 PROCEDURE — 71045 X-RAY EXAM CHEST 1 VIEW: CPT

## 2024-09-04 PROCEDURE — 81003 URINALYSIS AUTO W/O SCOPE: CPT | Performed by: PHYSICIAN ASSISTANT

## 2024-09-04 PROCEDURE — 80053 COMPREHEN METABOLIC PANEL: CPT | Performed by: EMERGENCY MEDICINE

## 2024-09-04 RX ORDER — SODIUM CHLORIDE 0.9 % (FLUSH) 0.9 %
10 SYRINGE (ML) INJECTION AS NEEDED
Status: DISCONTINUED | OUTPATIENT
Start: 2024-09-04 | End: 2024-09-05 | Stop reason: HOSPADM

## 2024-09-04 NOTE — ED PROVIDER NOTES
Subjective   History of Present Illness  This is a pleasant 73-year-old female that presents the ER with onset of palpitations that started this morning.  Patient reports heart rate feels fast at times and she also says it feels irregular at times.  She does have history of PVCs, and she says that she has had palpitations with antidepressants in the past.  Patient just recently was reinitiated on Prozac 20 mg by mouth daily.  She has taken Prozac, the same dosage, in March, 2024, as well and reported some palpitations.  She took 2 dosages and did not take her Prozac today due to concern of palpitations.  She reported some left chest wall/breast pain in the night 3 weeks ago.  EKG was performed at PCPs office recently and was normal and PCP is setting patient up for echo.  Patient has not had any recurrent chest pain or shortness of breath.  Patient denies any personal history of A-fib or SVT.  She denies any lower extremity pedal edema.  She denies any rhinorrhea, nasal congestion, or cough.  She does report drinking caffeine daily.  She had 2 Pepsi's today and will occasionally drink coffee.  Patient denies any other over-the-counter herbal supplements or cold medications such as decongestants.  Past medical history is also significant for anxiety, hyperlipidemia, history of endometrial cancer status post hysterectomy, depression, hypothyroidism, GERD, obstructive sleep apnea without use of CPAP.  Patient denies any tobacco use or alcohol use.  No other concerns at this time.  Patient denies any dizziness, near-syncope, or syncope.    History provided by:  Patient  Palpitations  Palpitations quality:  Irregular  Onset quality:  Sudden  Duration:  11 hours  Chronicity:  New  Context: caffeine    Context: not nicotine and not stimulant use    Context comment:  Palpitations intermittent since 8am this morning. Pt started Prozac 2 days ago and feels like this caused it. She's had palpitations with anti-depressants in  the past. Pt also drinks 2 Pepsi's per day.  Relieved by:  Nothing  Worsened by:  Nothing  Ineffective treatments:  None tried  Associated symptoms: chest pain (Transient left chest wall/breast pain in the night 3 wks ago. EKG at PCP's office recently was normal. Pt being set up for Echo.) and shortness of breath    Associated symptoms: no back pain, no chest pressure, no cough, no diaphoresis, no dizziness, no leg pain, no lower extremity edema, no malaise/fatigue, no nausea, no near-syncope, no numbness, no orthopnea, no PND, no syncope, no vomiting and no weakness    Risk factors comment:  History of PVC's in the past, and pt associates PVCs with anti-depressant use in the past. Just started on Prozac 2 days ago. Didn't take dosage today. Pt also drinks Pepsi and coffee.      Review of Systems   Constitutional: Negative.  Negative for diaphoresis and malaise/fatigue.   HENT: Negative.  Negative for congestion, ear pain, postnasal drip, rhinorrhea, sinus pressure, sinus pain, sneezing and sore throat.    Respiratory:  Positive for shortness of breath. Negative for cough and chest tightness.    Cardiovascular:  Positive for chest pain (Transient left chest wall/breast pain in the night 3 wks ago. EKG at PCP's office recently was normal. Pt being set up for Echo.) and palpitations. Negative for orthopnea, leg swelling, syncope, PND and near-syncope.        No h/o A Fib or SVT. History of PVC's in the past.   Gastrointestinal: Negative.  Negative for abdominal pain, constipation, diarrhea, nausea and vomiting.   Genitourinary: Negative.  Negative for dysuria, flank pain, frequency and urgency.   Musculoskeletal: Negative.  Negative for back pain.   Neurological: Negative.  Negative for dizziness, weakness and numbness.   Psychiatric/Behavioral:  Positive for behavioral problems (History of anxiety. Just started Prozac 2 days ago.).    All other systems reviewed and are negative.      Past Medical History:   Diagnosis  Date    Abnormal Pap smear of cervix     Anxiety     Arthritis     Cancer     endometrial hysterectomy    Constipation     Depression     Family history of breast cancer in sister     GERD (gastroesophageal reflux disease)     Hiatal hernia     HPV (human papilloma virus) infection     Hyperlipidemia     Hypothyroidism     Joint pain     hio    Menopause     Palpitations     Sleep apnea     no c-pap    Wears eyeglasses        Allergies   Allergen Reactions    Penicillins Hives    Bupropion Other (See Comments)     Breast pain       Past Surgical History:   Procedure Laterality Date    APPENDECTOMY      BREAST SURGERY      biopsy     SECTION      COLONOSCOPY      ESOPHAGEAL MOTILITY STUDY N/A 2024    Procedure: ESOPHAGEAL MOTILITY STUDY;  Surgeon: Salvador Low MD;  Location:  [facility];  Service: General;  Laterality: N/A;    TOTAL LAPAROSCOPIC HYSTERECTOMY SALPINGO OOPHORECTOMY Bilateral 2024    Procedure: INJECTION OF ICG DYE, TOTAL LAPAROSCOPIC HYSTERECTOMY, BILATERAL SALPINGOOPHORECTOMY, LEFT PELVIC SENTINEL LYMPH NODE DISSECTION WITH DAVINCI ROBOT;  Surgeon: Donna Phoenix MD;  Location:  [facility];  Service: Robotics - DaVinci;  Laterality: Bilateral;       Family History   Problem Relation Age of Onset    COPD Father     Coronary artery disease Mother     Rheum arthritis Brother     Diabetes Brother     Breast cancer Sister     Rheum arthritis Son        Social History     Socioeconomic History    Marital status:    Tobacco Use    Smoking status: Former     Current packs/day: 0.00     Types: Cigarettes     Start date:      Quit date: 2024     Years since quittin.5     Passive exposure: Current    Smokeless tobacco: Never    Tobacco comments:     1-2 cigarettes/day   Vaping Use    Vaping status: Never Used   Substance and Sexual Activity    Alcohol use: Not Currently    Drug use: Never    Sexual activity: Defer     Birth control/protection: Post-menopausal            Objective   Physical Exam  Vitals and nursing note reviewed.   Constitutional:       General: She is not in acute distress.     Appearance: Normal appearance. She is not ill-appearing, toxic-appearing or diaphoretic.      Comments: Pleasant and talkative.  No acute sign of pain or distress   HENT:      Head: Normocephalic and atraumatic.      Nose: Nose normal. No congestion or rhinorrhea.      Mouth/Throat:      Mouth: Mucous membranes are moist.      Pharynx: Oropharynx is clear.      Comments: Oral mucous membranes are moist light  Eyes:      Extraocular Movements: Extraocular movements intact.      Conjunctiva/sclera: Conjunctivae normal.      Pupils: Pupils are equal, round, and reactive to light.   Neck:      Thyroid: No thyromegaly.      Comments: No thyromegaly  Cardiovascular:      Rate and Rhythm: Normal rate and regular rhythm. Occasional Extrasystoles are present.     Pulses: Normal pulses.      Heart sounds: Normal heart sounds. No murmur heard.     Comments: Regular rate with occasional ectopic beat.  No murmurs appreciated.  No tachycardia.  No pedal edema to lower extremities  Pulmonary:      Effort: Pulmonary effort is normal.      Breath sounds: Normal breath sounds.      Comments: Lungs are clear to auscultation bilaterally  Abdominal:      General: Bowel sounds are normal.      Palpations: Abdomen is soft.   Musculoskeletal:         General: Normal range of motion.      Cervical back: Normal range of motion and neck supple.      Right lower leg: No edema.      Left lower leg: No edema.   Skin:     General: Skin is warm and dry.   Neurological:      General: No focal deficit present.      Mental Status: She is alert and oriented to person, place, and time.      Cranial Nerves: Cranial nerves 2-12 are intact.      Sensory: Sensation is intact.      Motor: Motor function is intact.      Coordination: Coordination is intact.      Gait: Gait is intact.      Comments: Alert and oriented x 3.   Neuro intact and nonfocal         Procedures           ED Course  ED Course as of 09/05/24 0015   Thu Sep 05, 2024   0008 I personally interpreted EKG which showed sinus rhythm.  No acute ST-T wave changes consistent with ischemia.  I also personally interpreted chest x-ray which reveals no acute cardiopulmonary process.  CBC and chemistries were within normal limits.  Kidney function and electrolytes are normal.  Urinalysis revealed no acute infectious process.  BNP is 252 and high-sensitivity troponin is 7.  TSH is 0.097 which is a little low.  Patient is on Synthroid 125 mcg p.o. daily.  She may need adjustments through PCP to lower her dosage.  Magnesium is 2.1.  I discussed patient's medications of SSRIs with hospital pharmacist.  Patient has switched SSRIs frequently through PCP, which can cause PVCs and prolonged QT interval on EKG.  There is no QT prolongation on today's EKG.  Patient was on Lexapro 10 mg by mouth daily in February, 2024, then she was switched to Prozac 20 mg by mouth daily in March, 2024, then she was switched to Cymbalta 30 mg by mouth daily in April, 2024, and most recently she was switched back to Prozac 20 mg by mouth daily on 8/27/2024.  Recommend patient to avoid caffeine, increase fluid intake, and we will refer her closely to our cardiac event monitor clinic.  She may benefit from Holter monitoring and cardiologist may wait and then give recommendations for antianxiety medication that will not cause PVCs.  Patient is agreeable with above treatment plan.  She is ready for discharge to home. [FC]      ED Course User Index  [FC] Mary Ann Young PA-C                                        Recent Results (from the past 24 hour(s))   ECG 12 Lead ED Triage Standing Order; Dysrhythmia    Collection Time: 09/04/24  7:29 PM   Result Value Ref Range    QT Interval 416 ms    QTC Interval 436 ms   Comprehensive Metabolic Panel    Collection Time: 09/04/24  7:50 PM    Specimen: Blood   Result  Value Ref Range    Glucose 106 (H) 65 - 99 mg/dL    BUN 13 8 - 23 mg/dL    Creatinine 0.92 0.57 - 1.00 mg/dL    Sodium 141 136 - 145 mmol/L    Potassium 4.1 3.5 - 5.2 mmol/L    Chloride 105 98 - 107 mmol/L    CO2 25.0 22.0 - 29.0 mmol/L    Calcium 9.1 8.6 - 10.5 mg/dL    Total Protein 6.6 6.0 - 8.5 g/dL    Albumin 3.8 3.5 - 5.2 g/dL    ALT (SGPT) 5 1 - 33 U/L    AST (SGOT) 14 1 - 32 U/L    Alkaline Phosphatase 94 39 - 117 U/L    Total Bilirubin 0.4 0.0 - 1.2 mg/dL    Globulin 2.8 gm/dL    A/G Ratio 1.4 g/dL    BUN/Creatinine Ratio 14.1 7.0 - 25.0    Anion Gap 11.0 5.0 - 15.0 mmol/L    eGFR 65.9 >60.0 mL/min/1.73   Magnesium    Collection Time: 09/04/24  7:50 PM    Specimen: Blood   Result Value Ref Range    Magnesium 2.1 1.6 - 2.4 mg/dL   Single High Sensitivity Troponin T    Collection Time: 09/04/24  7:50 PM    Specimen: Blood   Result Value Ref Range    HS Troponin T 7 <14 ng/L   TSH    Collection Time: 09/04/24  7:50 PM    Specimen: Blood   Result Value Ref Range    TSH 0.097 (L) 0.270 - 4.200 uIU/mL   BNP    Collection Time: 09/04/24  7:50 PM    Specimen: Blood   Result Value Ref Range    proBNP 252.0 0.0 - 900.0 pg/mL   Green Top (Gel)    Collection Time: 09/04/24  7:50 PM   Result Value Ref Range    Extra Tube Hold for add-ons.    Lavender Top    Collection Time: 09/04/24  7:50 PM   Result Value Ref Range    Extra Tube hold for add-on    Gold Top - SST    Collection Time: 09/04/24  7:50 PM   Result Value Ref Range    Extra Tube Hold for add-ons.    Gray Top    Collection Time: 09/04/24  7:50 PM   Result Value Ref Range    Extra Tube Hold for add-ons.    Light Blue Top    Collection Time: 09/04/24  7:50 PM   Result Value Ref Range    Extra Tube Hold for add-ons.    CBC Auto Differential    Collection Time: 09/04/24  7:50 PM    Specimen: Blood   Result Value Ref Range    WBC 6.63 3.40 - 10.80 10*3/mm3    RBC 4.68 3.77 - 5.28 10*6/mm3    Hemoglobin 13.9 12.0 - 15.9 g/dL    Hematocrit 43.0 34.0 - 46.6 %    MCV  91.9 79.0 - 97.0 fL    MCH 29.7 26.6 - 33.0 pg    MCHC 32.3 31.5 - 35.7 g/dL    RDW 13.4 12.3 - 15.4 %    RDW-SD 45.9 37.0 - 54.0 fl    MPV 9.9 6.0 - 12.0 fL    Platelets 251 140 - 450 10*3/mm3    Neutrophil % 57.7 42.7 - 76.0 %    Lymphocyte % 34.7 19.6 - 45.3 %    Monocyte % 4.8 (L) 5.0 - 12.0 %    Eosinophil % 1.7 0.3 - 6.2 %    Basophil % 0.9 0.0 - 1.5 %    Immature Grans % 0.2 0.0 - 0.5 %    Neutrophils, Absolute 3.83 1.70 - 7.00 10*3/mm3    Lymphocytes, Absolute 2.30 0.70 - 3.10 10*3/mm3    Monocytes, Absolute 0.32 0.10 - 0.90 10*3/mm3    Eosinophils, Absolute 0.11 0.00 - 0.40 10*3/mm3    Basophils, Absolute 0.06 0.00 - 0.20 10*3/mm3    Immature Grans, Absolute 0.01 0.00 - 0.05 10*3/mm3    nRBC 0.0 0.0 - 0.2 /100 WBC   Urinalysis With Microscopic If Indicated (No Culture) - Urine, Clean Catch    Collection Time: 09/04/24 10:23 PM    Specimen: Urine, Clean Catch   Result Value Ref Range    Color, UA Yellow Yellow, Straw    Appearance, UA Clear Clear    pH, UA 5.5 5.0 - 8.0    Specific Gravity, UA 1.024 1.001 - 1.030    Glucose, UA Negative Negative    Ketones, UA Negative Negative    Bilirubin, UA Negative Negative    Blood, UA Negative Negative    Protein, UA Negative Negative    Leuk Esterase, UA Negative Negative    Nitrite, UA Negative Negative    Urobilinogen, UA 0.2 E.U./dL 0.2 - 1.0 E.U./dL     Note: In addition to lab results from this visit, the labs listed above may include labs taken at another facility or during a different encounter within the last 24 hours. Please correlate lab times with ED admission and discharge times for further clarification of the services performed during this visit.    XR Chest 1 View   Final Result   Impression:   No active cardiopulmonary disease         Electronically Signed: Stephane Jackson     9/4/2024 7:46 PM EDT     Workstation ID: [id number]        Vitals:    09/04/24 1923   BP: 164/97   BP Location: Right arm   Patient Position: Sitting   Pulse: 75   Resp: 18   Temp:  "97.8 °F (36.6 °C)   TempSrc: Oral   SpO2: 99%   Weight: 80.3 kg (177 lb)   Height: 170.2 cm (67\")     Medications   sodium chloride 0.9 % flush 10 mL (has no administration in time range)     ECG/EMG Results (last 24 hours)       Procedure Component Value Units Date/Time    ECG 12 Lead ED Triage Standing Order; Dysrhythmia [[id number]] Collected: 09/04/24 1929     Updated: 09/04/24 1929     QT Interval 416 ms      QTC Interval 436 ms     Narrative:      Test Reason : ED Triage Standing Order~  Blood Pressure :   */*   mmHG  Vent. Rate :  66 BPM     Atrial Rate :  66 BPM     P-R Int : 138 ms          QRS Dur :  84 ms      QT Int : 416 ms       P-R-T Axes :  -1   2  31 degrees     QTc Int : 436 ms    Normal sinus rhythm  Minimal voltage criteria for LVH, may be normal variant  Cannot rule out Anterior infarct , age undetermined  Abnormal ECG  When compared with ECG of 21-FEB-2024 11:06,  Nonspecific T wave abnormality has replaced inverted T waves in Inferior leads    Referred By: EDMD           Confirmed By:           ECG 12 Lead ED Triage Standing Order; Dysrhythmia   Preliminary Result   Test Reason : ED Triage Standing Order~   Blood Pressure :   */*   mmHG   Vent. Rate :  66 BPM     Atrial Rate :  66 BPM      P-R Int : 138 ms          QRS Dur :  84 ms       QT Int : 416 ms       P-R-T Axes :  -1   2  31 degrees      QTc Int : 436 ms      Normal sinus rhythm   Minimal voltage criteria for LVH, may be normal variant   Cannot rule out Anterior infarct , age undetermined   Abnormal ECG   When compared with ECG of 21-FEB-2024 11:06,   Nonspecific T wave abnormality has replaced inverted T waves in Inferior    leads      Referred By: EDMD           Confirmed By:                  Medical Decision Making  Amount and/or Complexity of Data Reviewed  Labs: ordered.  Radiology: ordered.  ECG/medicine tests: ordered.    Risk  Prescription drug management.        Final diagnoses:   Palpitations   PVC's (premature ventricular " contractions)   Adverse effect of drug, initial encounter   Low TSH level   History of hypothyroidism   Anxiety and depression   History of hyperlipidemia   Caffeine use       ED Disposition  ED Disposition       ED Disposition   Discharge    Condition   Stable    Comment   --               [facility] CARD[facility]OLOG[facility]  [address]  [address]  [facility] [address] [phone]  Call in 1 day  Call tomorrow for first available [facility]  [address] [id number] [phone]    Schedule an appointment as soon as possible for a visit in 2 days  Close PCP follow-up for [facility] EMERGENCY DEPARTMENT  [address]  [facility] [address] [phone]    If symptoms worsen         Medication List        Changed      clobetasol 0.05 % ointment  Commonly known as: TEMOVATE  Apply 1 application  topically to the appropriate area as directed 2 (Two) Times a Day.  What changed:   when to take this  reasons to take this                 Mary Ann Young PA-C  09/05/24 0015

## 2024-09-05 ENCOUNTER — TELEPHONE (OUTPATIENT)
Dept: GYNECOLOGIC ONCOLOGY | Facility: CLINIC | Age: 74
End: 2024-09-05
Payer: MEDICARE

## 2024-09-05 NOTE — TELEPHONE ENCOUNTER
Caller: Ricarda Alvarez    Relationship: Self    Best call back number: [phone]    What was the call regarding: PATIENT WANTED TO KNOW WHAT TYPE OF APPOINTMENT SHE HAS ON 9-16-24

## 2024-09-05 NOTE — DISCHARGE INSTRUCTIONS
ER evaluation reveals normal cardiac workup with normal EKG and normal cardiac troponin.  Urinalysis reveals no acute infectious process and CBC and chemistries reveal normal electrolytes, including sodium, potassium, and magnesium.  TSH was slightly low at 0.097 with normal level being 0.27.  Patient is on Synthroid 125 mcg by mouth daily and needs to follow-up closely with PCP to see if she needs to decrease her dosage to the next lower level.  Patient will be referred to our cardiac event monitor clinic.  PVCs can be a result of SSRIs, which is a class of medications used for anxiety/depression.  Patient will benefit from Holter monitoring and cardiology could weigh in and recommend anti-anxiety medication that does not cause any PVCs or palpitations.  Encourage fluids and avoid caffeine.  Continue with all other current medical management.  Return to the ER if worsening symptoms.

## 2024-09-08 LAB
QT INTERVAL: 416 MS
QTC INTERVAL: 436 MS

## 2024-09-12 ENCOUNTER — HOSPITAL ENCOUNTER (OUTPATIENT)
Dept: CARDIOLOGY | Facility: HOSPITAL | Age: 74
Discharge: HOME | End: 2024-09-12
Payer: MEDICARE

## 2024-09-12 ENCOUNTER — OFFICE VISIT (OUTPATIENT)
Dept: CARDIOLOGY | Facility: HOSPITAL | Age: 74
End: 2024-09-12
Payer: MEDICARE

## 2024-09-12 VITALS
OXYGEN SATURATION: 99 % | BODY MASS INDEX: 28.14 KG/M2 | WEIGHT: 179.31 LBS | SYSTOLIC BLOOD PRESSURE: 131 MMHG | HEIGHT: 67 IN | HEART RATE: 69 BPM | DIASTOLIC BLOOD PRESSURE: 67 MMHG | TEMPERATURE: 98 F | RESPIRATION RATE: 18 BRPM

## 2024-09-12 DIAGNOSIS — F41.9 ANXIETY: ICD-10-CM

## 2024-09-12 DIAGNOSIS — R00.2 PALPITATIONS: Primary | ICD-10-CM

## 2024-09-12 DIAGNOSIS — R00.2 PALPITATIONS: ICD-10-CM

## 2024-09-12 DIAGNOSIS — I49.3 PVC'S (PREMATURE VENTRICULAR CONTRACTIONS): ICD-10-CM

## 2024-09-12 DIAGNOSIS — G47.33 OSA (OBSTRUCTIVE SLEEP APNEA): ICD-10-CM

## 2024-09-12 PROCEDURE — 93246 EXT ECG>7D<15D RECORDING: CPT

## 2024-09-12 NOTE — PROGRESS NOTES
"[facility], [facility] Heart and Vascular    Chief Complaint  Palpitations    Subjective    History of Present Illness {CC  Problem List  Visit  Diagnosis   Encounters  Notes  Medications  Labs  Result Review Imaging  Media :23}     Ricarda Alvarez presents to [facility] CARDIOLOGY for   History of Present Illness     74 yo old female with hx of anxiety/depression, hypothyroidism, HLP, palpitaitons/PVC, GERD, TED (no Cpap use).      Pt presents to ED on 09/04/24 with palpitaitons.  Hx of PVCs  palpitations for over 30 years.  Worse recently.     Pt of palpitaitons with depression meds.  Recently restarted prozac.     Pt reports intermittent palpitations.  Has awakened from sleep with chest pain.  NO dyspnea, dizziness, near syncope, syncope.      Lost 100lb over the last year due to anxiety/depression.  Has not used cpap in 1 year.     Objective     Vital Signs:   Vitals:    09/12/24 1447 09/12/24 1449   BP: 138/71 131/67   BP Location: Left arm Left arm   Patient Position: Standing Sitting   Cuff Size: Adult Adult   Pulse: 82 69   Resp:  18   Temp:  98 °F (36.7 °C)   TempSrc:  Temporal   SpO2: 99% 99%   Weight:  81.3 kg (179 lb 5 oz)   Height:  170.2 cm (67\")     Body mass index is 28.08 kg/m².  Physical Exam  Vitals reviewed.   Constitutional:       General: She is not in acute distress.  Cardiovascular:      Rate and Rhythm: Normal rate and regular rhythm.   Pulmonary:      Effort: Pulmonary effort is normal.      Breath sounds: Normal breath sounds.   Musculoskeletal:      Right lower leg: No edema.      Left lower leg: No edema.   Skin:     Coloration: Skin is not pale.   Neurological:      Mental Status: She is alert.   Psychiatric:         Mood and Affect: Mood normal.         Behavior: Behavior normal. Behavior is cooperative.              Result Review  Data Reviewed:{ Labs  Result Review  Imaging  Med Tab  Media :23}     EKG 09/08/24:  NSR 66 " bpm.     Admission on 09/04/2024, Discharged on 09/05/2024   Component Date Value Ref Range Status    QT Interval 09/04/2024 416  ms Final    QTC Interval 09/04/2024 436  ms Final    Glucose 09/04/2024 106 (H)  65 - 99 mg/dL Final    BUN 09/04/2024 13  8 - 23 mg/dL Final    Creatinine 09/04/2024 0.92  0.57 - 1.00 mg/dL Final    Sodium 09/04/2024 141  136 - 145 mmol/L Final    Potassium 09/04/2024 4.1  3.5 - 5.2 mmol/L Final    Slight hemolysis detected by analyzer. Result may be falsely elevated.    Chloride 09/04/2024 105  98 - 107 mmol/L Final    CO2 09/04/2024 25.0  22.0 - 29.0 mmol/L Final    Calcium 09/04/2024 9.1  8.6 - 10.5 mg/dL Final    Total Protein 09/04/2024 6.6  6.0 - 8.5 g/dL Final    Albumin 09/04/2024 3.8  3.5 - 5.2 g/dL Final    ALT (SGPT) 09/04/2024 5  1 - 33 U/L Final    AST (SGOT) 09/04/2024 14  1 - 32 U/L Final    Alkaline Phosphatase 09/04/2024 94  39 - 117 U/L Final    Total Bilirubin 09/04/2024 0.4  0.0 - 1.2 mg/dL Final    Globulin 09/04/2024 2.8  gm/dL Final    Calculated Result    A/G Ratio 09/04/2024 1.4  g/dL Final    BUN/Creatinine Ratio 09/04/2024 14.1  7.0 - 25.0 Final    Anion Gap 09/04/2024 11.0  5.0 - 15.0 mmol/L Final    eGFR 09/04/2024 65.9  >60.0 mL/min/1.73 Final    Magnesium 09/04/2024 2.1  1.6 - 2.4 mg/dL Final    HS Troponin T 09/04/2024 7  <14 ng/L Final    TSH 09/04/2024 0.097 (L)  0.270 - 4.200 uIU/mL Final    proBNP 09/04/2024 252.0  0.0 - 900.0 pg/mL Final    Extra Tube 09/04/2024 Hold for add-ons.   Final    Auto resulted.    Extra Tube 09/04/2024 hold for add-on   Final    Auto resulted    Extra Tube 09/04/2024 Hold for add-ons.   Final    Auto resulted.    Extra Tube 09/04/2024 Hold for add-ons.   Final    Auto resulted.    Extra Tube 09/04/2024 Hold for add-ons.   Final    Auto resulted    WBC 09/04/2024 6.63  3.40 - 10.80 10*3/mm3 Final    RBC 09/04/2024 4.68  3.77 - 5.28 10*6/mm3 Final    Hemoglobin 09/04/2024 13.9  12.0 - 15.9 g/dL Final    Hematocrit 09/04/2024  43.0  34.0 - 46.6 % Final    MCV 09/04/2024 91.9  79.0 - 97.0 fL Final    MCH 09/04/2024 29.7  26.6 - 33.0 pg Final    MCHC 09/04/2024 32.3  31.5 - 35.7 g/dL Final    RDW 09/04/2024 13.4  12.3 - 15.4 % Final    RDW-SD 09/04/2024 45.9  37.0 - 54.0 fl Final    MPV 09/04/2024 9.9  6.0 - 12.0 fL Final    Platelets 09/04/2024 251  140 - 450 10*3/mm3 Final    Neutrophil % 09/04/2024 57.7  42.7 - 76.0 % Final    Lymphocyte % 09/04/2024 34.7  19.6 - 45.3 % Final    Monocyte % 09/04/2024 4.8 (L)  5.0 - 12.0 % Final    Eosinophil % 09/04/2024 1.7  0.3 - 6.2 % Final    Basophil % 09/04/2024 0.9  0.0 - 1.5 % Final    Immature Grans % 09/04/2024 0.2  0.0 - 0.5 % Final    Neutrophils, Absolute 09/04/2024 3.83  1.70 - 7.00 10*3/mm3 Final    Lymphocytes, Absolute 09/04/2024 2.30  0.70 - 3.10 10*3/mm3 Final    Monocytes, Absolute 09/04/2024 0.32  0.10 - 0.90 10*3/mm3 Final    Eosinophils, Absolute 09/04/2024 0.11  0.00 - 0.40 10*3/mm3 Final    Basophils, Absolute 09/04/2024 0.06  0.00 - 0.20 10*3/mm3 Final    Immature Grans, Absolute 09/04/2024 0.01  0.00 - 0.05 10*3/mm3 Final    nRBC 09/04/2024 0.0  0.0 - 0.2 /100 WBC Final    Color, UA 09/04/2024 Yellow  Yellow, Straw Final    Appearance, UA 09/04/2024 Clear  Clear Final    pH, UA 09/04/2024 5.5  5.0 - 8.0 Final    Specific Gravity, UA 09/04/2024 1.024  1.001 - 1.030 Final    Glucose, UA 09/04/2024 Negative  Negative Final    Ketones, UA 09/04/2024 Negative  Negative Final    Bilirubin, UA 09/04/2024 Negative  Negative Final    Blood, UA 09/04/2024 Negative  Negative Final    Protein, UA 09/04/2024 Negative  Negative Final    Leuk Esterase, UA 09/04/2024 Negative  Negative Final    Nitrite, UA 09/04/2024 Negative  Negative Final    Urobilinogen, UA 09/04/2024 0.2 E.U./dL  0.2 - 1.0 E.U./dL Final                   Assessment and Plan {CC Problem List  Visit Diagnosis  ROS  Review (Popup)  Health Maintenance  Quality  BestPractice  Medications  SmartSets  SnapShot  Encounters  Media :23}   1. Palpitations  Education provided on palpitations.  Patient has had increased depression and anxiety with the 100 pound weight loss.  Can be a trigger for palpitations.  Palpitations also noted after starting antianxiety medicine which is not uncommon.  Will place a heart monitor today to assess for arrhythmias.    Pending results may consider echocardiogram, but monitor at this time.  - Holter Monitor - 72 Hour Up To 15 Days; Future    2. PVC's (premature ventricular contractions)    - Holter Monitor - 72 Hour Up To 15 Days; Future    3. Anxiety  Patient's anxiety and depression is not well-controlled.  Encourage patient to discuss with primary care provider about restarting antianxiety or depression medications.  Education provided that palpitations are not uncommon when initiating medications for the first 2 to 4 weeks.    4. TED (obstructive sleep apnea)  History of CPAP use but has not used over the last year.  Encouraged continued use or may need to consider a repeat stress test.  Monitor at this time and discuss further at later date.          Follow Up {Instructions Charge Capture  Follow-up Communications :23}   Return in about 6 weeks (around 10/24/2024), or if symptoms worsen or fail to improve, for monitor results, palpitations.    Patient was given instructions and counseling regarding her condition or for health maintenance advice. Please see specific information pulled into the AVS if appropriate.  Patient was instructed to call the [facility] with any questions, concerns, or worsening symptoms.

## 2024-09-12 NOTE — PROGRESS NOTES
[facility] Heart Monitor Documentation    Ricarda Alvarez  1950  [id number]  09/12/24      [] ZIO XT Patch  Model [id number] Prescribed for N/A Days    Serial Number: (N + 9 Digits) N   Apply-By Date on Box:   USPS Tracking Number:   USPS Tracking        [] [employer] MINI PLUS Mobile Cardiac Telemetry  Model BGMINIPLUS Prescribed for N/A Days    Serial Number: (BGM + 7 Digits) BGM  Shipped-By Date on Box:   UPS Tracking Number: 1Z  UPS Tracking      [] [employer] MINI Holter Monitor  Model BGMINIEL Prescribed for 14 Days    Serial Number: (7 Digits) [id number]  Shipped-By Date on Box: 258147  UPS Tracking Number: [id number]  UPS Tracking        This monitor was applied to the patient's chest and checked for proper functioning.  Ms. Ricarda Alvarez was instructed in the proper use of this monitor.  She was given the opportunity to ask questions and left the office with the device 's instruction manual.    Sharita Segovia MA, 15:19 EDT, 09/12/24                  [facility] 8[facility]8.[facility]

## 2024-09-16 ENCOUNTER — OFFICE VISIT (OUTPATIENT)
Dept: GYNECOLOGIC ONCOLOGY | Facility: CLINIC | Age: 74
End: 2024-09-16
Payer: MEDICARE

## 2024-09-16 VITALS
OXYGEN SATURATION: 98 % | HEIGHT: 67 IN | TEMPERATURE: 97.5 F | WEIGHT: 178 LBS | DIASTOLIC BLOOD PRESSURE: 56 MMHG | RESPIRATION RATE: 17 BRPM | SYSTOLIC BLOOD PRESSURE: 121 MMHG | BODY MASS INDEX: 27.94 KG/M2 | HEART RATE: 68 BPM

## 2024-09-16 DIAGNOSIS — Z85.42 HISTORY OF ENDOMETRIAL CANCER: Primary | ICD-10-CM

## 2024-09-16 PROCEDURE — 1126F AMNT PAIN NOTED NONE PRSNT: CPT | Performed by: NURSE PRACTITIONER

## 2024-09-16 PROCEDURE — 99215 OFFICE O/P EST HI 40 MIN: CPT | Performed by: NURSE PRACTITIONER

## 2024-09-16 PROCEDURE — 1159F MED LIST DOCD IN RCRD: CPT | Performed by: NURSE PRACTITIONER

## 2024-09-16 PROCEDURE — 1160F RVW MEDS BY RX/DR IN RCRD: CPT | Performed by: NURSE PRACTITIONER

## 2024-09-16 RX ORDER — DIPHENHYDRAMINE, LIDOCAINE, NYSTATIN
KIT ORAL
Qty: 240 ML | Refills: 3 | Status: CANCELLED | OUTPATIENT
Start: 2024-09-16

## 2024-09-17 ENCOUNTER — CLINICAL SUPPORT (OUTPATIENT)
Dept: CARDIOLOGY | Facility: HOSPITAL | Age: 74
End: 2024-09-17
Payer: MEDICARE

## 2024-11-06 ENCOUNTER — OFFICE VISIT (OUTPATIENT)
Dept: CARDIOLOGY | Facility: HOSPITAL | Age: 74
End: 2024-11-06
Payer: MEDICARE

## 2024-11-06 VITALS
WEIGHT: 173.8 LBS | DIASTOLIC BLOOD PRESSURE: 69 MMHG | HEART RATE: 86 BPM | SYSTOLIC BLOOD PRESSURE: 134 MMHG | HEIGHT: 67 IN | TEMPERATURE: 97.6 F | BODY MASS INDEX: 27.28 KG/M2 | RESPIRATION RATE: 18 BRPM | OXYGEN SATURATION: 99 %

## 2024-11-06 DIAGNOSIS — G47.33 OSA (OBSTRUCTIVE SLEEP APNEA): ICD-10-CM

## 2024-11-06 DIAGNOSIS — R00.2 PALPITATIONS: Primary | ICD-10-CM

## 2024-11-06 DIAGNOSIS — F41.9 ANXIETY: ICD-10-CM

## 2024-11-06 DIAGNOSIS — I49.3 PVC'S (PREMATURE VENTRICULAR CONTRACTIONS): ICD-10-CM

## 2024-11-06 DIAGNOSIS — R79.89 ABNORMAL TSH: ICD-10-CM

## 2024-11-06 NOTE — PROGRESS NOTES
"                              [facility], [facility] Heart and Vascular    Chief Complaint  Palpitations    Subjective    History of Present Illness {CC  Problem List  Visit  Diagnosis   Encounters  Notes  Medications  Labs  Result Review Imaging  Media :23}     Ricarda Alvarez presents to [facility] CARDIOLOGY for   History of Present Illness     75 yo old female with hx of anxiety/depression, hypothyroidism, HLP, palpitaitons/PVC, GERD, TED (no Cpap use).     F/u on palpitations.     Recently palpitations were noted when starting antidepressant meds.     Lost 100lb over the last year due to anxiety/depression.  Has not used cpap in 1 year.     14 day holter 09/12/24:  Ave HR 68 bpm, patient with PACs and PVCs less than 1%.  Majority of patient's symptoms associated with sinus.  She did have brief SVT with 1 patient occurrence.    09/19/24 stress echo:  no ischemia,  Normal EF, mild LVH, mild AI.     Palpitations improved after stopping antipressant. Denies CP, pressure, dizziness, near syncope.     Thyroid meds had been decreased due to low TSH.  Followed by PCP.     Objective     Vital Signs:   Vitals:    11/06/24 0833   BP: 134/69   BP Location: Left arm   Patient Position: Sitting   Cuff Size: Adult   Pulse: 86   Resp: 18   Temp: 97.6 °F (36.4 °C)   TempSrc: Temporal   SpO2: 99%   Weight: 78.8 kg (173 lb 12.8 oz)   Height: 170.2 cm (67\")     Body mass index is 27.22 kg/m².  Physical Exam         Result Review  Data Reviewed:{ Labs  Result Review  Imaging  Med Tab  Media :23}   14 day holter 09/12/24:  Ave HR 68 bpm, patient with PACs and PVCs less than 1%.  Majority of patient's symptoms associated with sinus.  She did have brief SVT with 1 patient occurrence.    09/19/24 stress echo:  no ischemia,  Normal EF, mild LVH, mild AI.     Admission on 09/04/2024, Discharged on 09/05/2024   Component Date Value Ref Range Status    QT Interval 09/04/2024 416 "  ms Final    QTC Interval 09/04/2024 436  ms Final    Glucose 09/04/2024 106 (H)  65 - 99 mg/dL Final    BUN 09/04/2024 13  8 - 23 mg/dL Final    Creatinine 09/04/2024 0.92  0.57 - 1.00 mg/dL Final    Sodium 09/04/2024 141  136 - 145 mmol/L Final    Potassium 09/04/2024 4.1  3.5 - 5.2 mmol/L Final    Slight hemolysis detected by analyzer. Result may be falsely elevated.    Chloride 09/04/2024 105  98 - 107 mmol/L Final    CO2 09/04/2024 25.0  22.0 - 29.0 mmol/L Final    Calcium 09/04/2024 9.1  8.6 - 10.5 mg/dL Final    Total Protein 09/04/2024 6.6  6.0 - 8.5 g/dL Final    Albumin 09/04/2024 3.8  3.5 - 5.2 g/dL Final    ALT (SGPT) 09/04/2024 5  1 - 33 U/L Final    AST (SGOT) 09/04/2024 14  1 - 32 U/L Final    Alkaline Phosphatase 09/04/2024 94  39 - 117 U/L Final    Total Bilirubin 09/04/2024 0.4  0.0 - 1.2 mg/dL Final    Globulin 09/04/2024 2.8  gm/dL Final    Calculated Result    A/G Ratio 09/04/2024 1.4  g/dL Final    BUN/Creatinine Ratio 09/04/2024 14.1  7.0 - 25.0 Final    Anion Gap 09/04/2024 11.0  5.0 - 15.0 mmol/L Final    eGFR 09/04/2024 65.9  >60.0 mL/min/1.73 Final    Magnesium 09/04/2024 2.1  1.6 - 2.4 mg/dL Final    HS Troponin T 09/04/2024 7  <14 ng/L Final    TSH 09/04/2024 0.097 (L)  0.270 - 4.200 uIU/mL Final    proBNP 09/04/2024 252.0  0.0 - 900.0 pg/mL Final    Extra Tube 09/04/2024 Hold for add-ons.   Final    Auto resulted.    Extra Tube 09/04/2024 hold for add-on   Final    Auto resulted    Extra Tube 09/04/2024 Hold for add-ons.   Final    Auto resulted.    Extra Tube 09/04/2024 Hold for add-ons.   Final    Auto resulted.    Extra Tube 09/04/2024 Hold for add-ons.   Final    Auto resulted    WBC 09/04/2024 6.63  3.40 - 10.80 10*3/mm3 Final    RBC 09/04/2024 4.68  3.77 - 5.28 10*6/mm3 Final    Hemoglobin 09/04/2024 13.9  12.0 - 15.9 g/dL Final    Hematocrit 09/04/2024 43.0  34.0 - 46.6 % Final    MCV 09/04/2024 91.9  79.0 - 97.0 fL Final    MCH 09/04/2024 29.7  26.6 - 33.0 pg Final    MCHC  09/04/2024 32.3  31.5 - 35.7 g/dL Final    RDW 09/04/2024 13.4  12.3 - 15.4 % Final    RDW-SD 09/04/2024 45.9  37.0 - 54.0 fl Final    MPV 09/04/2024 9.9  6.0 - 12.0 fL Final    Platelets 09/04/2024 251  140 - 450 10*3/mm3 Final    Neutrophil % 09/04/2024 57.7  42.7 - 76.0 % Final    Lymphocyte % 09/04/2024 34.7  19.6 - 45.3 % Final    Monocyte % 09/04/2024 4.8 (L)  5.0 - 12.0 % Final    Eosinophil % 09/04/2024 1.7  0.3 - 6.2 % Final    Basophil % 09/04/2024 0.9  0.0 - 1.5 % Final    Immature Grans % 09/04/2024 0.2  0.0 - 0.5 % Final    Neutrophils, Absolute 09/04/2024 3.83  1.70 - 7.00 10*3/mm3 Final    Lymphocytes, Absolute 09/04/2024 2.30  0.70 - 3.10 10*3/mm3 Final    Monocytes, Absolute 09/04/2024 0.32  0.10 - 0.90 10*3/mm3 Final    Eosinophils, Absolute 09/04/2024 0.11  0.00 - 0.40 10*3/mm3 Final    Basophils, Absolute 09/04/2024 0.06  0.00 - 0.20 10*3/mm3 Final    Immature Grans, Absolute 09/04/2024 0.01  0.00 - 0.05 10*3/mm3 Final    nRBC 09/04/2024 0.0  0.0 - 0.2 /100 WBC Final    Color, UA 09/04/2024 Yellow  Yellow, Straw Final    Appearance, UA 09/04/2024 Clear  Clear Final    pH, UA 09/04/2024 5.5  5.0 - 8.0 Final    Specific Gravity, UA 09/04/2024 1.024  1.001 - 1.030 Final    Glucose, UA 09/04/2024 Negative  Negative Final    Ketones, UA 09/04/2024 Negative  Negative Final    Bilirubin, UA 09/04/2024 Negative  Negative Final    Blood, UA 09/04/2024 Negative  Negative Final    Protein, UA 09/04/2024 Negative  Negative Final    Leuk Esterase, UA 09/04/2024 Negative  Negative Final    Nitrite, UA 09/04/2024 Negative  Negative Final    Urobilinogen, UA 09/04/2024 0.2 E.U./dL  0.2 - 1.0 E.U./dL Final                   Assessment and Plan {CC Problem List  Visit Diagnosis  ROS  Review (Popup)  Health Maintenance  Quality  BestPractice  Medications  SmartSets  SnapShot Encounters  Media :23}   1. Palpitations  Palpitations improved after stopping antidepressant  No concerning heart arrhythmia  identified    2. PVC's (premature ventricular contractions)  PACs and PVCs less than 1%  Reviewed recent stress echo.  No ischemia.  Normal EF.  No significant valvular disease.  3. Anxiety  Patient has not been able to tolerate antidepressants due to palpitations.  Currently on BuSpar    4. TED (obstructive sleep apnea)  History of CPAP use.  Has not used since she has lost 100 pounds.    5. Abnormal TSH  TSH low 2 months ago.  Primary care provider decreased Synthroid.  Has had repeat lab work this past week.  Followed by primary care for continued monitoring and management.          Follow Up {Instructions Charge Capture  Follow-up Communications :23}   Return if symptoms worsen or fail to improve.    Patient was given instructions and counseling regarding her condition or for health maintenance advice. Please see specific information pulled into the AVS if appropriate.  Patient was instructed to call the [facility] with any questions, concerns, or worsening symptoms.

## 2025-01-07 ENCOUNTER — TELEPHONE (OUTPATIENT)
Dept: GYNECOLOGIC ONCOLOGY | Facility: CLINIC | Age: 75
End: 2025-01-07

## 2025-01-07 NOTE — TELEPHONE ENCOUNTER
Caller: Ricarda Alvarez    Relationship:  Self    Best call back number: [phone]    PATIENT CALLED REQUESTING TO CANCEL SAME DAY APPT.    Did the patient call AFTER the start time of their scheduled appointment?  []YES  [x]NO    Any additional information: CANCEL THIS MORNING APPOINTMENT AND CALL TO RESCHEDULE

## 2025-01-24 ENCOUNTER — OFFICE VISIT (OUTPATIENT)
Dept: GYNECOLOGIC ONCOLOGY | Facility: CLINIC | Age: 75
End: 2025-01-24
Payer: MEDICARE

## 2025-01-24 VITALS
WEIGHT: 169 LBS | HEART RATE: 84 BPM | BODY MASS INDEX: 26.53 KG/M2 | TEMPERATURE: 98 F | DIASTOLIC BLOOD PRESSURE: 89 MMHG | OXYGEN SATURATION: 97 % | HEIGHT: 67 IN | RESPIRATION RATE: 18 BRPM | SYSTOLIC BLOOD PRESSURE: 146 MMHG

## 2025-01-24 DIAGNOSIS — Z85.42 HISTORY OF ENDOMETRIAL CANCER: Primary | ICD-10-CM

## 2025-01-24 NOTE — PROGRESS NOTES
"GYN ONCOLOGY CANCER SURVEILLANCE FOLLOW-UP    Ricarda Alvarez  [id number]  1950    Subjective   Chief Complaint: Follow up (endometrial ca)      History of present illness:   Ricarda Alvarez is a 74 y.o. year old female who is here today for ongoing surveillance of Endometrial Cancer, see Cancer History. Next month she will be 1 year out since completion of treatment with surgery alone. She is doing well from a gyn standpoint. She has no acute complaints or concerns at this time.  Denies any major changes in health since last visit.  She denies vaginal bleeding and discharge. She does not have abdominal or pelvic pain. +will experience \"twinge of pain\" every once in a while in her vagina, occurs very infrequently and does not last very long. She is tolerating a regular diet and endorses a normal appetite. She denies nausea and vomiting. She denies early satiety and bloating. Denies any CP, SOB, lightheadedness or dizziness. She denies changes in her bowel/bladder. No dysuria, frequency, urgency, hematuria or flank pain. Reports normal energy levels. Pt has questions today about surveillance plan and schedule and inquires about her staging.     She continues working towards better control of mental health. Her weight is down (unintentional) >60lbs in the past 12 months and down >100lbs overall (weight= 282lbs in 2/2022). Reports that she is eating better. Still has indigestion/upper abd burning after eating. Has previously completed extensive gi w/u (EGD, colonoscopy, nuc med gastric emptying scan, CT abd/ pelvis) for abnormal gi symptoms and elevated LFT which all was reportedly normal-- Reflux sxs were attributed to uncontrolled anxiety.  She has now following [facility].  She sees both a therapist for counseling and cognitive behavioral therapy and provider for medication management.  Has not yet found a medication that has been therapeutic thus far unfortunately.  Also having " problems finding a medication she can tolerate as most have precipitated tachypalpitations.  Ricarda saw Jeannie Zeng with cardiology for consultation and evaluation of palpitations on 11-6-2024.  Additional notable past medical history include TED with noncompliant CPAP use and thyroid disease.  Last TSH was low 4 months ago at 0.097.  Per note review PCP decreased Synthroid dose and their office is monitoring ongoing lab work and med management adjustment.    Last annual exam was 2- by Cheryl Patel.  She follows routinely with this office for general gyn care and annual exams   Last visit: 1-3-24, Dr Sandy Nix    PCP is Dr Jayde Elkins     Health Maintenance:  -Last colonoscopy on file for review by Dr Ann Abebe @[facility] 11/2019, path pending, with recommendation to repeat in 3 to 5 years.  -Last bilateral screening mammogram 10/2022 at [facility] BI-RADS 2.  -Last DEXA 6/2018 at [facility] showing normal BMD    Cancer History:   Oncology/Hematology History   Endometrial cancer   2/19/2024 Initial Diagnosis    Endometrial cancer     2/19/2024 Cancer Staged    Staging form: Corpus Uteri - Carcinoma And Carcinosarcoma, AJCC 8th Edition  - Clinical stage from 2/19/2024: FIGO Stage I (cT1, cN0, cM0) - Signed by Donna Phoenix MD on 2/19/2024  Staging comments: Estrogen receptor: positive, strong, 100%.  Progesterone receptor: positive, strong, 100%.  P53 by IHC: wild-type expression.  PD-L1 (22C3): will be reported in an addendum.  MSI testing by immunohistochemistry results:       MLH1: LOSS OF NUCLEAR STAINING       PMS2: LOSS OF NUCLEAR STAINING       MSH2: Retained nuclear staining       MSH6: Retained nuclear staining  IHC interpretation: This is an abnormal phenotype. Loss of MLH1 and PMS2 is indicative of a loss of expression of MLH1. These findings suggest a MSI-H tumor. To differentiate between sporadic and germ line loss of MLH1 expression, a MLH1 promoter methylation study  has been ordered and results will be reported in an addendum.  * patient would be a good candidate for immunotherapy based on MSI-H status      Survivorship    Survivorship Care Plan completed and discussed with patient.  Copy of Survivorship Care Plan provided to patient and primary care provider.                   .          Oncology/Hematology History   Endometrial cancer   2/19/2024 Initial Diagnosis    Endometrial cancer     2/19/2024 Cancer Staged    Staging form: Corpus Uteri - Carcinoma And Carcinosarcoma, AJCC 8th Edition  - Clinical stage from 2/19/2024: FIGO Stage I (cT1, cN0, cM0) - Signed by Donna Phoenix MD on 2/19/2024  Staging comments: Estrogen receptor: positive, strong, 100%.  Progesterone receptor: positive, strong, 100%.  P53 by IHC: wild-type expression.  PD-L1 (22C3): will be reported in an addendum.  MSI testing by immunohistochemistry results:       MLH1: LOSS OF NUCLEAR STAINING       PMS2: LOSS OF NUCLEAR STAINING       MSH2: Retained nuclear staining       MSH6: Retained nuclear staining  IHC interpretation: This is an abnormal phenotype. Loss of MLH1 and PMS2 is indicative of a loss of expression of MLH1. These findings suggest a MSI-H tumor. To differentiate between sporadic and germ line loss of MLH1 expression, a MLH1 promoter methylation study has been ordered and results will be reported in an addendum.  * patient would be a good candidate for immunotherapy based on MSI-H status      Survivorship    Survivorship Care Plan completed and discussed with patient.  Copy of Survivorship Care Plan provided to patient and primary care provider.                         The current medication list and allergy list were reviewed and reconciled.     Past Medical History, Past Surgical History, Social History, Family History have been reviewed and are without significant changes except as mentioned.      Review of Systems   Constitutional: Negative.    Gastrointestinal:  Positive for GERD.  "Negative for abdominal distention, abdominal pain, anal bleeding, blood in stool, constipation, diarrhea, nausea, rectal pain and vomiting.   Genitourinary: Negative.    Psychiatric/Behavioral:  Positive for depressed mood. The patient is nervous/anxious.            Objective   Physical Exam  Vital Signs: /89   Pulse 84   Temp 98 °F (36.7 °C) (Temporal)   Resp 18   Ht 170.2 cm (67\")   Wt 76.7 kg (169 lb)   LMP  (LMP Unknown)   SpO2 97%   BMI 26.47 kg/m²   Vitals:    01/24/25 1458   PainSc: 0-No pain           General Appearance:  alert, cooperative, no apparent distress, appears stated age, and normal weight   Neurologic/Psych: A&O x 3, gait steady, appropriate affect   HEENT:  Normocephalic, without obvious abnormality, mucous membranes moist   Abdomen:   Soft, non-tender, non-distended, no organomegaly, and Exam limited d/t habitus.   Lymph nodes: No cervical, supraclavicular, inguinal adenopathy noted   Pelvic: External Genitalia  without lesions or skin changes. On speculum exam mild atrophic changes within the vagina are noted.  There is no discharge or bleeding.  Vaginal Cuff: smooth, intact, and without visible lesions.  Bimanual exam reveals no palpable masses or fullness.  Cervix, uterus, and ovaries are surgically absent.  Exam well-tolerated, no tenderness.  Rectovaginal exam deferred.       ECOG score: 1                  Assessment and Plan:     Ricarda Alvarez is a 73 y.o. with a history of a stage I, G2 endometrial cancer s/p treatment with surgery alone by Dr Phoenix (treatment completed in 2/2024).  She is currently without clinical evidence of disease. Signs of recurrent disease, such as vaginal bleeding, persistent abdominopelvic pain, urinary or bowel changes, and shortness of breath were reviewed.  She was advised to follow up immediately if she develops any of the above symptoms.  She is understanding to call with any changes in pelvic symptoms or general GYN concerns at any " time between regularly scheduled visits. She will follow-up in 6m.     Health maintenance: She was reminded to maintain a healthy lifestyle with a well balanced diet, calcium and vitamin D for osteoporosis prevention, and exercise as well as continue with recommended health and cancer screening guidelines.       Follow-up with PCP and all specialist as scheduled.  Let her know I am very pleased to hear that she has followed through and now seeing both providers at behavioral health to get a better handle on mood.  Encouraged to stick with that.    Diagnoses and all orders for this visit:    1. History of endometrial cancer (Primary)      Pain assessment was performed today as a part of patient’s care.  For patients with pain related to surgery, gynecologic malignancy or cancer treatment, the plan is as noted in the assessment/plan.  For patients with pain not related to these issues, they are to seek any further needed care from a more appropriate provider, such as PCP.    Follow-up:     Return to clinic in 6 months for ongoing cancer surveillance.      Electronically signed by LUDWIN Hampton on 01/24/2025

## 2025-07-18 ENCOUNTER — OFFICE VISIT (OUTPATIENT)
Dept: GYNECOLOGIC ONCOLOGY | Facility: CLINIC | Age: 75
End: 2025-07-18
Payer: MEDICARE

## 2025-07-18 VITALS
WEIGHT: 172.9 LBS | TEMPERATURE: 97.3 F | RESPIRATION RATE: 17 BRPM | HEART RATE: 75 BPM | BODY MASS INDEX: 27.14 KG/M2 | DIASTOLIC BLOOD PRESSURE: 58 MMHG | OXYGEN SATURATION: 99 % | SYSTOLIC BLOOD PRESSURE: 111 MMHG | HEIGHT: 67 IN

## 2025-07-18 DIAGNOSIS — Z85.42 HISTORY OF ENDOMETRIAL CANCER: Primary | ICD-10-CM

## 2025-07-18 DIAGNOSIS — L30.9 DERMATITIS: ICD-10-CM

## 2025-07-18 RX ORDER — NYSTATIN 100000 [USP'U]/G
POWDER TOPICAL 2 TIMES DAILY PRN
Qty: 30 G | Refills: 0 | Status: SHIPPED | OUTPATIENT
Start: 2025-07-18

## 2025-07-18 RX ORDER — CLOBETASOL PROPIONATE 0.5 MG/G
CREAM TOPICAL
Qty: 60 G | Refills: 1 | Status: SHIPPED | OUTPATIENT
Start: 2025-07-18

## 2025-07-18 RX ORDER — CLONAZEPAM 0.5 MG/1
TABLET ORAL
COMMUNITY
Start: 2025-03-24

## (undated) DEVICE — SCRB SURG BACTOSHIELD CHG 4PCT 4OZ

## (undated) DEVICE — SYS VISABILITY LAP/SCPE LAPAROVUE CLN WARM 2/PRT 3TO12MM

## (undated) DEVICE — KT POSTN TRENDELENBURG NUBLUE WING/PAD NOSTRAP 1P/U LF

## (undated) DEVICE — BOWL UTIL STRL 32OZ

## (undated) DEVICE — SUT MNCRYL PLS ANTIB UD 3/0 PS2 27IN

## (undated) DEVICE — TROC BLADLES ANCHORPORT/OPTI LP 8X120MM 1P/U

## (undated) DEVICE — MANIP UTER RUMI 2 KOH EFFICIENT 2.5CM BL

## (undated) DEVICE — PAD ARMBRD SURG CONVOL 7.5X20X2IN

## (undated) DEVICE — SEAL UNIV DAVINCI/X/XI 5TO12MM

## (undated) DEVICE — SYR LUERLOK 50ML

## (undated) DEVICE — OBT BLADLES ENDOWRIST DAVINCI/X/XI 8MM DISP

## (undated) DEVICE — OCCL COLPO PNEUMO  STRL

## (undated) DEVICE — SUT VIC PLS 0 CTD ANTIB BR UNDYE 27IN

## (undated) DEVICE — MANIP UTER RUMI TP 5.1MM 3.75CM

## (undated) DEVICE — UNDERGLV SURG BIOGEL INDICAT PF 61/2 GRN

## (undated) DEVICE — Device

## (undated) DEVICE — DRP ARM DAVINCI/X/XI DISP BX20

## (undated) DEVICE — ADHS SKIN PREMIERPRO EXOFIN TOPICAL HI/VISC .5ML

## (undated) DEVICE — BNDR ABD PREMIUM/UNIV 10IN 27TO48IN

## (undated) DEVICE — GLV SURG SENSICARE PI MIC PF SZ6 LF STRL

## (undated) DEVICE — ST TBG AIRSEAL FLTR TRI LUM

## (undated) DEVICE — PK SOL VISC ESOPH 80ML

## (undated) DEVICE — PAD GRND E/S MEGADYNE MONOPLR 2/PLT W/CORD A/ DISP

## (undated) DEVICE — CVR TP ENDOWRIST DAVINCI 8MM DISP

## (undated) DEVICE — NDL BLNT 18G 1 1/2IN

## (undated) DEVICE — NDL VERRES PNEUMO 150MM PN150 LF

## (undated) DEVICE — APPL CHLORAPREP TINTED 26ML TEAL

## (undated) DEVICE — SUT VIC 0 CTD BR 27IN UNDYE VCP260H

## (undated) DEVICE — MANIP UTER RUMI TP 5.1MM 6CM LAV

## (undated) DEVICE — PK MAJ GYN DAVINCI 10